# Patient Record
Sex: FEMALE | Race: BLACK OR AFRICAN AMERICAN | Employment: UNEMPLOYED | URBAN - METROPOLITAN AREA
[De-identification: names, ages, dates, MRNs, and addresses within clinical notes are randomized per-mention and may not be internally consistent; named-entity substitution may affect disease eponyms.]

---
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
## 2022-12-03 ENCOUNTER — HOSPITAL ENCOUNTER (OUTPATIENT)
Dept: DATA CONVERSION | Facility: HOSPITAL | Age: 22
End: 2022-12-03
Attending: OBSTETRICS & GYNECOLOGY

## 2022-12-03 DIAGNOSIS — O99.342 OTHER MENTAL DISORDERS COMPLICATING PREGNANCY, SECOND TRIMESTER (HHS-HCC): ICD-10-CM

## 2022-12-03 DIAGNOSIS — R10.2 PELVIC AND PERINEAL PAIN: ICD-10-CM

## 2022-12-03 DIAGNOSIS — F31.9 BIPOLAR DISORDER, UNSPECIFIED (MULTI): ICD-10-CM

## 2022-12-03 DIAGNOSIS — Z79.899 OTHER LONG TERM (CURRENT) DRUG THERAPY: ICD-10-CM

## 2022-12-03 DIAGNOSIS — O10.912 UNSPECIFIED PRE-EXISTING HYPERTENSION COMPLICATING PREGNANCY, SECOND TRIMESTER (HHS-HCC): ICD-10-CM

## 2022-12-03 DIAGNOSIS — N89.8 OTHER SPECIFIED NONINFLAMMATORY DISORDERS OF VAGINA: ICD-10-CM

## 2022-12-03 DIAGNOSIS — O98.812 OTHER MATERNAL INFECTIOUS AND PARASITIC DISEASES COMPLICATING PREGNANCY, SECOND TRIMESTER (HHS-HCC): ICD-10-CM

## 2022-12-03 DIAGNOSIS — O98.512 OTHER VIRAL DISEASES COMPLICATING PREGNANCY, SECOND TRIMESTER (HHS-HCC): ICD-10-CM

## 2022-12-03 DIAGNOSIS — B37.9 CANDIDIASIS, UNSPECIFIED: ICD-10-CM

## 2022-12-03 DIAGNOSIS — Z87.440 PERSONAL HISTORY OF URINARY (TRACT) INFECTIONS: ICD-10-CM

## 2022-12-03 DIAGNOSIS — O26.892 OTHER SPECIFIED PREGNANCY RELATED CONDITIONS, SECOND TRIMESTER (HHS-HCC): ICD-10-CM

## 2022-12-03 DIAGNOSIS — B00.9 HERPESVIRAL INFECTION, UNSPECIFIED: ICD-10-CM

## 2022-12-03 DIAGNOSIS — Z3A.16 16 WEEKS GESTATION OF PREGNANCY (HHS-HCC): ICD-10-CM

## 2022-12-03 DIAGNOSIS — R56.9 UNSPECIFIED CONVULSIONS (MULTI): ICD-10-CM

## 2022-12-03 LAB
APPEARANCE, URINE: ABNORMAL
BACTERIA, URINE: ABNORMAL /HPF
BILIRUBIN, URINE: NEGATIVE
BLOOD, URINE: NEGATIVE
CLUE CELLS WET PREP: ABNORMAL
COLOR, URINE: ABNORMAL
GLUCOSE, URINE: NEGATIVE MG/DL
KETONES, URINE: ABNORMAL MG/DL
LEUKOCYTE ESTERASE, URINE: ABNORMAL
MUCUS, URINE: ABNORMAL /LPF
NITRITE, URINE: NEGATIVE
PH, URINE: 5 (ref 5–8)
PROTEIN, URINE: ABNORMAL MG/DL
RBC, URINE: 15 /HPF (ref 0–5)
SPECIFIC GRAVITY, URINE: 1.03 (ref 1–1.03)
SQUAMOUS EPITHELIAL CELLS, URINE: 48 /HPF
TRICH WET PREP: ABNORMAL
TRICHOMONAS REFLEX COMMENT: ABNORMAL
UROBILINOGEN, URINE: 4 MG/DL (ref 0–1.9)
WBC WET PREP: ABNORMAL /HPF
WBC, URINE: 110 /HPF (ref 0–5)
YEAST PRESENCE WET PREP: PRESENT

## 2022-12-04 LAB
CHLAMYDIA TRACH., AMPLIFIED: NEGATIVE
N. GONORRHEA, AMPLIFIED: NEGATIVE
TRICHOMONAS VAGINALIS: NEGATIVE

## 2023-03-01 NOTE — PROGRESS NOTES
Current Stage:   Stage: Triage     Subjective Data:   Antepartum:  Vaginal Bleeding: No   Contractions/Abdominal Pain: No   Discharge/Loss of Fluid: Yes   Fetal Movement: None   Fevers/Chills: Yes   Preeclampsia Symptoms: No   Antepartum:    23 yo  at 16.2 wga by 13 wkUS presents with vaginal pain and irritation. Denies VB, LOF, cramping, vaginal odor. Reports vaginal irritation for past month. Pt treated for trich,  BV, yeast on . Denies VB with intercourse. Accepts STI testing today. Reports she had a fever earlier today but afebrile in triage.     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     OBHx:  : SAB  GYN hx:  Chlamydia  PMH: Depression, Vit D deficiency  PSH: denies  Fam hx: non-contributory  Meds: PNV, Acyclovir daily  All: Codeine --> hives  Social hx: denies t/e/d        Objective Information:    Objective Information:      T   P  R  BP   MAP  SpO2   Value  36.5  72  16  108/63   80  98%  Date/Time 12/3 18:11 12/3 19:20 12/3 18:11 12/3 19:20  12/3 19:20 12/3 18:08  Range  (36.5C - 36.5C )  (72 - 78 )  (16 - 18 )  (108 - 115 )/ (63 - 77 )  (80 - 82 )  (98% - 98% )      Physical Exam:   Constitutional: alert, oriented x3, conversational   Obstetric: Doptone 155    SSE: mod amt of gray/white discharge  Cervix visually closed   Eyes: Sclera white, EOM intact, no discharge or erythema   ENMT: No hearing deficit, no goiter present   Head/Neck: Normocephalic, atraumatic, full range  of motion intact   Respiratory/Thorax: normal respiratory effort   Gastrointestinal: soft, gravid, non-tender   Genitourinary: No flank pain bilaterally.  No suprapubic tenderness.   Musculoskeletal: Grossly WNL   Extremities: No edema, discoloration, or pain in  BLE   Neurological: Speech clear, no deficits noted   Psychological: Appropriate  mood, behavior. Calm,  cooperative.   Skin: no rashes or lesions     Assessment and Plan:   Assessment:    21 yo  at 16.2 wga by 13 wkUS presents with vaginal pain and irritation.     Vaginal irritation  - suspect vaginitis and UTI based on physical exam  - GC/CT, wet prep, urine culture sent  - Urine dip +3 leuks, +1 protein, trace blood, SG > 1.030  - Script for Macrobid 100 mg PO Q 12 hr x 7 days sent  - will treat vaginitis as needed once resulted  - discussed need for adequate hydration  - discussed warning signs, when to return for evaluation    Maternal Well Being  - discussed plan of care  - instructed to keep appt as scheduled on  or sooner if needed    Fetal Well Being  - Doptone 155  - Cervix visually closed    Dispo: Home with precautions    Staffed with Dr. Rianna Garcia, MSN, APRN, [facility]      Plan of Care Reviewed With:  Plan of Care Reviewed With: patient; significant  other     Attestation:   Note Completion:  I am a:  Advanced Practice Provider   Attending Only - Shared Visit with Advanced Practice Provider This is a shared visit.  I have reviewed the Advanced Practice Provider?s encounter note, approve the Advanced Practice Provider?s documentation,  and provide the following additional information from my personal encounter.    Comments/ Additional Findings    agree with exam, diagnosis, and treatment    Jason Blanca DO          Electronic Signatures:  Jason Blanca ()  (Signed 03-Dec-2022 20:38)   Authored: Note Completion   Co-Signer: Current Stage, Subjective Data, Objective Data, Assessment and Plan, Note Completion  Sherie Garcia (APRN-CNP)  (Signed 03-Dec-2022 19:45)   Authored: Current Stage, Subjective Data, Objective Data,  Assessment and Plan, Note Completion      Last Updated: 03-Dec-2022 20:38 by Jason Blanca ()

## 2023-09-06 VITALS
SYSTOLIC BLOOD PRESSURE: 113 MMHG | HEIGHT: 65 IN | WEIGHT: 225.53 LBS | OXYGEN SATURATION: 98 % | BODY MASS INDEX: 37.58 KG/M2 | HEART RATE: 73 BPM | RESPIRATION RATE: 18 BRPM | DIASTOLIC BLOOD PRESSURE: 77 MMHG

## 2023-09-14 NOTE — PROGRESS NOTES
Current Stage:   Stage: Triage     Subjective Data:   Antepartum:  Vaginal Bleeding: No   Contractions/Abdominal Pain: No   Discharge/Loss of Fluid: Yes   Fetal Movement: None   Fevers/Chills: Yes   Preeclampsia Symptoms: No   Antepartum:    21 yo  at 16.2 wga by 13 wkUS presents with vaginal pain and irritation. Denies VB, LOF, cramping, vaginal odor. Reports vaginal irritation for past month. Pt treated for trich,  BV, yeast on . Denies VB with intercourse. Accepts STI testing today. Reports she had a fever earlier today but afebrile in triage.     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     OBHx:  : SAB  GYN hx:  Chlamydia  PMH: Depression, Vit D deficiency  PSH: denies  Fam hx: non-contributory  Meds: PNV, Acyclovir daily  All: Codeine --> hives  Social hx: denies t/e/d        Objective Information:    Objective Information:      T   P  R  BP   MAP  SpO2   Value  36.5  72  16  108/63   80  98%  Date/Time 12/3 18:11 12/3 19:20 12/3 18:11 12/3 19:20  12/3 19:20 12/3 18:08  Range  (36.5C - 36.5C )  (72 - 78 )  (16 - 18 )  (108 - 115 )/ (63 - 77 )  (80 - 82 )  (98% - 98% )      Physical Exam:   Constitutional: alert, oriented x3, conversational   Obstetric: Doptone 155    SSE: mod amt of gray/white discharge  Cervix visually closed   Eyes: Sclera white, EOM intact, no discharge or erythema   ENMT: No hearing deficit, no goiter present   Head/Neck: Normocephalic, atraumatic, full range  of motion intact   Respiratory/Thorax: normal respiratory effort   Gastrointestinal: soft, gravid, non-tender   Genitourinary: No flank pain bilaterally.  No suprapubic tenderness.   Musculoskeletal: Grossly WNL   Extremities: No edema, discoloration, or pain in  BLE   Neurological: Speech clear, no deficits noted   Psychological: Appropriate  mood, behavior. Calm,  cooperative.   Skin: no rashes or lesions     Assessment and Plan:   Assessment:    23 yo  at 16.2 wga by 13 wkUS presents with vaginal pain and irritation.     Vaginal irritation  - suspect vaginitis and UTI based on physical exam  - GC/CT, wet prep, urine culture sent  - Urine dip +3 leuks, +1 protein, trace blood, SG > 1.030  - Script for Macrobid 100 mg PO Q 12 hr x 7 days sent  - will treat vaginitis as needed once resulted  - discussed need for adequate hydration  - discussed warning signs, when to return for evaluation    Maternal Well Being  - discussed plan of care  - instructed to keep appt as scheduled on  or sooner if needed    Fetal Well Being  - Doptone 155  - Cervix visually closed    Dispo: Home with precautions    Staffed with Dr. Rianna Garcia, MSN, APRN, [facility]      Plan of Care Reviewed With:  Plan of Care Reviewed With: patient; significant  other     Attestation:   Note Completion:  I am a:  Advanced Practice Provider   Attending Only - Shared Visit with Advanced Practice Provider This is a shared visit.  I have reviewed the Advanced Practice Provider?s encounter note, approve the Advanced Practice Provider?s documentation,  and provide the following additional information from my personal encounter.    Comments/ Additional Findings    agree with exam, diagnosis, and treatment    Jason Blanca DO          Electronic Signatures:  Jason Blanca ()  (Signed 03-Dec-2022 20:38)   Authored: Note Completion   Co-Signer: Current Stage, Subjective Data, Objective Data, Assessment and Plan, Note Completion  Sherie Garcia (APRN-CNP)  (Signed 03-Dec-2022 19:45)   Authored: Current Stage, Subjective Data, Objective Data,  Assessment and Plan, Note Completion      Last Updated: 03-Dec-2022 20:38 by Jason Blanca ()

## 2024-03-08 ENCOUNTER — APPOINTMENT (OUTPATIENT)
Dept: RADIOLOGY | Facility: HOSPITAL | Age: 24
End: 2024-03-08
Payer: MEDICAID

## 2024-03-08 ENCOUNTER — APPOINTMENT (OUTPATIENT)
Dept: CARDIOLOGY | Facility: HOSPITAL | Age: 24
End: 2024-03-08
Payer: MEDICAID

## 2024-03-08 ENCOUNTER — HOSPITAL ENCOUNTER (EMERGENCY)
Facility: HOSPITAL | Age: 24
Discharge: HOME | End: 2024-03-08
Payer: MEDICAID

## 2024-03-08 VITALS
TEMPERATURE: 96.8 F | HEART RATE: 68 BPM | DIASTOLIC BLOOD PRESSURE: 95 MMHG | RESPIRATION RATE: 18 BRPM | BODY MASS INDEX: 43.32 KG/M2 | WEIGHT: 260 LBS | OXYGEN SATURATION: 100 % | SYSTOLIC BLOOD PRESSURE: 141 MMHG | HEIGHT: 65 IN

## 2024-03-08 DIAGNOSIS — R07.9 CHEST PAIN, UNSPECIFIED TYPE: Primary | ICD-10-CM

## 2024-03-08 DIAGNOSIS — M79.605 LEG PAIN, BILATERAL: ICD-10-CM

## 2024-03-08 DIAGNOSIS — M79.604 LEG PAIN, BILATERAL: ICD-10-CM

## 2024-03-08 LAB
ALBUMIN SERPL-MCNC: 4.3 G/DL (ref 3.5–5)
ALP BLD-CCNC: 60 U/L (ref 35–125)
ALT SERPL-CCNC: 9 U/L (ref 5–40)
ANION GAP SERPL CALC-SCNC: 9 MMOL/L
AST SERPL-CCNC: 13 U/L (ref 5–40)
BASOPHILS # BLD AUTO: 0.04 X10*3/UL (ref 0–0.1)
BASOPHILS NFR BLD AUTO: 0.5 %
BILIRUB SERPL-MCNC: 0.4 MG/DL (ref 0.1–1.2)
BUN SERPL-MCNC: 11 MG/DL (ref 8–25)
CALCIUM SERPL-MCNC: 9.3 MG/DL (ref 8.5–10.4)
CHLORIDE SERPL-SCNC: 106 MMOL/L (ref 97–107)
CO2 SERPL-SCNC: 26 MMOL/L (ref 24–31)
CREAT SERPL-MCNC: 0.8 MG/DL (ref 0.4–1.6)
EGFRCR SERPLBLD CKD-EPI 2021: >90 ML/MIN/1.73M*2
EOSINOPHIL # BLD AUTO: 0.13 X10*3/UL (ref 0–0.7)
EOSINOPHIL NFR BLD AUTO: 1.6 %
ERYTHROCYTE [DISTWIDTH] IN BLOOD BY AUTOMATED COUNT: 12.2 % (ref 11.5–14.5)
FLUAV RNA RESP QL NAA+PROBE: NOT DETECTED
FLUBV RNA RESP QL NAA+PROBE: NOT DETECTED
GLUCOSE SERPL-MCNC: 79 MG/DL (ref 65–99)
HCG SERPL-ACNC: <1 MIU/ML
HCT VFR BLD AUTO: 40.2 % (ref 36–46)
HGB BLD-MCNC: 13.4 G/DL (ref 12–16)
IMM GRANULOCYTES # BLD AUTO: 0.01 X10*3/UL (ref 0–0.7)
IMM GRANULOCYTES NFR BLD AUTO: 0.1 % (ref 0–0.9)
LYMPHOCYTES # BLD AUTO: 3.57 X10*3/UL (ref 1.2–4.8)
LYMPHOCYTES NFR BLD AUTO: 43.6 %
MCH RBC QN AUTO: 30.9 PG (ref 26–34)
MCHC RBC AUTO-ENTMCNC: 33.3 G/DL (ref 32–36)
MCV RBC AUTO: 93 FL (ref 80–100)
MONOCYTES # BLD AUTO: 0.76 X10*3/UL (ref 0.1–1)
MONOCYTES NFR BLD AUTO: 9.3 %
NEUTROPHILS # BLD AUTO: 3.68 X10*3/UL (ref 1.2–7.7)
NEUTROPHILS NFR BLD AUTO: 44.9 %
NRBC BLD-RTO: 0 /100 WBCS (ref 0–0)
NT-PROBNP SERPL-MCNC: <36 PG/ML (ref 0–178)
PLATELET # BLD AUTO: 297 X10*3/UL (ref 150–450)
POTASSIUM SERPL-SCNC: 4 MMOL/L (ref 3.4–5.1)
PROT SERPL-MCNC: 7.9 G/DL (ref 5.9–7.9)
RBC # BLD AUTO: 4.34 X10*6/UL (ref 4–5.2)
RBC MORPH BLD: NORMAL
RSV RNA RESP QL NAA+PROBE: NOT DETECTED
SARS-COV-2 RNA RESP QL NAA+PROBE: NOT DETECTED
SODIUM SERPL-SCNC: 141 MMOL/L (ref 133–145)
TROPONIN T SERPL-MCNC: <6 NG/L
WBC # BLD AUTO: 8.2 X10*3/UL (ref 4.4–11.3)

## 2024-03-08 PROCEDURE — 84702 CHORIONIC GONADOTROPIN TEST: CPT | Performed by: PHYSICIAN ASSISTANT

## 2024-03-08 PROCEDURE — 87637 SARSCOV2&INF A&B&RSV AMP PRB: CPT | Performed by: PHYSICIAN ASSISTANT

## 2024-03-08 PROCEDURE — 99285 EMERGENCY DEPT VISIT HI MDM: CPT | Mod: 25

## 2024-03-08 PROCEDURE — 85025 COMPLETE CBC W/AUTO DIFF WBC: CPT | Performed by: PHYSICIAN ASSISTANT

## 2024-03-08 PROCEDURE — 93005 ELECTROCARDIOGRAM TRACING: CPT

## 2024-03-08 PROCEDURE — 96374 THER/PROPH/DIAG INJ IV PUSH: CPT

## 2024-03-08 PROCEDURE — 84484 ASSAY OF TROPONIN QUANT: CPT | Performed by: PHYSICIAN ASSISTANT

## 2024-03-08 PROCEDURE — 71046 X-RAY EXAM CHEST 2 VIEWS: CPT

## 2024-03-08 PROCEDURE — 93970 EXTREMITY STUDY: CPT | Performed by: RADIOLOGY

## 2024-03-08 PROCEDURE — 36415 COLL VENOUS BLD VENIPUNCTURE: CPT | Performed by: PHYSICIAN ASSISTANT

## 2024-03-08 PROCEDURE — 83880 ASSAY OF NATRIURETIC PEPTIDE: CPT | Performed by: PHYSICIAN ASSISTANT

## 2024-03-08 PROCEDURE — 2500000004 HC RX 250 GENERAL PHARMACY W/ HCPCS (ALT 636 FOR OP/ED): Performed by: PHYSICIAN ASSISTANT

## 2024-03-08 PROCEDURE — 71046 X-RAY EXAM CHEST 2 VIEWS: CPT | Performed by: RADIOLOGY

## 2024-03-08 PROCEDURE — 84075 ASSAY ALKALINE PHOSPHATASE: CPT | Performed by: PHYSICIAN ASSISTANT

## 2024-03-08 PROCEDURE — 93970 EXTREMITY STUDY: CPT

## 2024-03-08 RX ORDER — ACETAMINOPHEN 325 MG/1
650 TABLET ORAL ONCE
Status: COMPLETED | OUTPATIENT
Start: 2024-03-08 | End: 2024-03-08

## 2024-03-08 RX ORDER — KETOROLAC TROMETHAMINE 30 MG/ML
15 INJECTION, SOLUTION INTRAMUSCULAR; INTRAVENOUS ONCE
Status: COMPLETED | OUTPATIENT
Start: 2024-03-08 | End: 2024-03-08

## 2024-03-08 RX ADMIN — ACETAMINOPHEN 650 MG: 325 TABLET ORAL at 16:23

## 2024-03-08 RX ADMIN — KETOROLAC TROMETHAMINE 15 MG: 30 INJECTION, SOLUTION INTRAMUSCULAR at 16:25

## 2024-03-08 ASSESSMENT — COLUMBIA-SUICIDE SEVERITY RATING SCALE - C-SSRS
1. IN THE PAST MONTH, HAVE YOU WISHED YOU WERE DEAD OR WISHED YOU COULD GO TO SLEEP AND NOT WAKE UP?: NO
6. HAVE YOU EVER DONE ANYTHING, STARTED TO DO ANYTHING, OR PREPARED TO DO ANYTHING TO END YOUR LIFE?: NO
2. HAVE YOU ACTUALLY HAD ANY THOUGHTS OF KILLING YOURSELF?: NO

## 2024-03-08 ASSESSMENT — LIFESTYLE VARIABLES
EVER FELT BAD OR GUILTY ABOUT YOUR DRINKING: NO
HAVE YOU EVER FELT YOU SHOULD CUT DOWN ON YOUR DRINKING: NO
EVER HAD A DRINK FIRST THING IN THE MORNING TO STEADY YOUR NERVES TO GET RID OF A HANGOVER: NO
HAVE PEOPLE ANNOYED YOU BY CRITICIZING YOUR DRINKING: NO

## 2024-03-08 NOTE — ED PROVIDER NOTES
HPI   Chief Complaint   Patient presents with    Chest Pain     Pt complains of chest pain on the right side. Recenty diagnosed wit lupus was told to come here if she was in pain       HPI     Patient is a 23-year-old female with a history of lupus presenting for right-sided chest pain.  The chest pain is been present for 2 weeks, dull, aching and constant.  No radiation of pain to the left side or to the back.  No associated shortness of breath.  Patient states that she attempted to get an appointment with her rheumatologist but they were unable to see her for the next 2 to 3 months and she was told to come to the emergency department for evaluation.  She is also complaining of generalized body aches and pains with swelling to the lower extremities.  She denies recent travel or unilateral leg swelling.  No hormone use.  No history of DVT or PE.  No recent surgery.  No abdominal pain, nausea, vomiting, diarrhea or constipation.  No numbness or weakness.  Patient has no cardiac history.               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    Delayed sleep phase syndrome    Elevation of levels of liver transaminase levels 11/08/2018    Transaminitis    Encounter for immunization 11/30/2016    Immunization due    Headache, unspecified 01/07/2020    Chronic daily headache    Hypersomnia, unspecified 02/21/2018    Hypersomnia    Inadequate sleep hygiene 02/21/2018    Inadequate sleep hygiene    Insufficient sleep syndrome 02/21/2018    Insufficient sleep syndrome    Morbid (severe) obesity due to excess calories (CMS/[facility])     Class 3 severe obesity due to excess calories with serious comorbidity and body mass index (BMI) of 40.0 to 44.9 in adult    Other conditions influencing health status 10/13/2022    History of  pregnancy    Other specified abnormal findings of blood chemistry 11/05/2018    Low vitamin D level    Other symptoms and signs involving the nervous system 01/17/2018    Suspected sleep apnea    Personal history of other diseases of the digestive system 05/06/2020    History of constipation    Personal history of other endocrine, nutritional and metabolic disease 11/05/2018    History of obesity    Personal history of other specified conditions 11/14/2019    History of chest pain    Personal history of other specified conditions 04/23/2019    History of palpitations    Personal history of other specified conditions 11/13/2019    History of urinary frequency    Personal history of other specified conditions 11/13/2019    History of diarrhea    Presence of spectacles and contact lenses 11/30/2016    Wears glasses     No past surgical history on file.  No family history on file.  Social History     Tobacco Use    Smoking status: Not on file    Smokeless tobacco: Not on file   Substance Use Topics    Alcohol use: Not on file    Drug use: Not on file       Physical Exam   ED Triage Vitals [03/08/24 1558]   Temperature Heart Rate Respirations BP   36 °C (96.8 °F) 68 18 (!) 141/95      Pulse Ox Temp Source Heart Rate Source Patient Position   100 % Oral -- Sitting      BP Location FiO2 (%)     Right arm --       Physical Exam  GENERAL: Well nourished and well developed. Answers questions appropriately.    SKIN: Clean and dry without evidence of rashes.    HEENT: Skull normocephalic, atraumatic. No scleral icterus. PERRLA, with EOMI.  Mucous membranes moist and pink in color.     RESPIRATORY: Clear to ausculation with normal effort. No adventitious sounds, intercostal retractions or shallow breathing.    CARDIOVASCULAR: Regular rate and rhythm with normal S1, S2. No S3, S4, murmurs or gallops. Capillary refill brisk, less than 3 seconds bilaterally.  Diffuse bilateral extremity swelling.    ABD/: Soft, nontender abdomen.  Bowel sounds equal in all four quadrants. No tenderness, rebound, guarding or rigidity.    MSK: Spontaneously moves all 4 extremities without difficulty.  No injury or obvious deformity.      NEURO/PSYCH: A&Ox3. Cranial nerves II-XII grossly intact. No focal motor or sensory deficits. Appropriate mood and behavior.    ED Course & MDM   Diagnoses as of 03/08/24 1922   Chest pain, unspecified type   Leg pain, bilateral       Medical Decision Making  Parts of this chart have been completed using voice recognition software. Please excuse any errors of transcription. Despite the medical decision making time stamp above-my medical decision making has taken place during the patient's entire visit. My thought process and reason for plan has been formulated from the time that I saw the patient until the time of disposition and is not specific to one specific moment during their visit and furthermore my MDM encompasses this entire chart and not only this text box.      HPI: Detailed above.    Exam: A medically appropriate exam performed, outlined above, given the known history and presentation.    History obtained from: patient    Social Determinants of Health considered during this visit: age    EKG interpreted by my attending physician, reviewed by myself.    Labs Reviewed   COMPREHENSIVE METABOLIC PANEL - Normal       Result Value    Glucose 79      Sodium 141      Potassium 4.0      Chloride 106      Bicarbonate 26      Urea Nitrogen 11      Creatinine 0.80      eGFR >90      Calcium 9.3      Albumin 4.3      Alkaline Phosphatase 60      Total Protein 7.9      AST 13      Bilirubin, Total 0.4      ALT 9      Anion Gap 9     SARS-COV-2 AND INFLUENZA A/B PCR - Normal    Flu A Result Not Detected      Flu B Result Not Detected      Coronavirus 2019, PCR Not Detected      Narrative:     This assay has received FDA Emergency Use Authorization (EUA) and  is only authorized for the duration of time that circumstances exist to  justify the authorization of the emergency use of in vitro diagnostic tests for the detection of SARS-CoV-2 virus and/or diagnosis of COVID-19 infection under section 564(b)(1) of the Act, 21 U.S.C. 360bbb-3(b)(1). Testing for SARS-CoV-2 is only recommended for patients who meet current clinical and/or epidemiological criteria as defined by federal, state, or local public health directives. This assay is an in vitro diagnostic nucleic acid amplification test for the qualitative detection of SARS-CoV-2, Influenza A, and Influenza B from nasopharyngeal specimens and has been validated for use at [facility]. Negative results do not preclude COVID-19 infections or Influenza A/B infections, and should not be used as the sole basis for diagnosis, treatment, or other management decisions. If Influenza A/B and RSV PCR results are negative, testing for Parainfluenza virus, Adenovirus and Metapneumovirus is routinely performed for [facility] [place], [place] pediatric oncology and intensive care inpatients, and is available on other patients by placing an add-on request.    RSV PCR - Normal    RSV PCR Not Detected      Narrative:     This assay is an FDA-cleared, in vitro diagnostic nucleic acid amplification test for the detection of RSV from nasopharyngeal specimens, and has been validated for use at [facility]. Negative results do not preclude RSV infections, and should not be used as the sole basis for diagnosis, treatment, or other management decisions. If Influenza A/B and RSV PCR results are negative, testing for Parainfluenza virus, Adenovirus and Metapneumovirus is routinely performed for pediatric oncology and intensive care inpatients at [facility] [place], [place], and is available on other patients by placing an add-on request.       SERIAL TROPONIN, INITIAL ([facility]) - Normal    Troponin T, High Sensitivity <6     N-TERMINAL PROBNP - Normal    PROBNP <36      Narrative:     Reference ranges are based on clinical  submission data. These ranges represent the 95th percentile of normal cut-off points. As NT Pro- BNP values approach 1000 pg/ml, clinical symptoms are more likely associated with CHF.   CBC WITH AUTO DIFFERENTIAL    WBC 8.2      nRBC 0.0      RBC 4.34      Hemoglobin 13.4      Hematocrit 40.2      MCV 93      MCH 30.9      MCHC 33.3      RDW 12.2      Platelets 297      Neutrophils % 44.9      Immature Granulocytes %, Automated 0.1      Lymphocytes % 43.6      Monocytes % 9.3      Eosinophils % 1.6      Basophils % 0.5      Neutrophils Absolute 3.68      Immature Granulocytes Absolute, Automated 0.01      Lymphocytes Absolute 3.57      Monocytes Absolute 0.76      Eosinophils Absolute 0.13      Basophils Absolute 0.04     HUMAN CHORIONIC GONADOTROPIN, SERUM QUANTITATIVE    HCG, Beta-Quantitative <1     TROPONIN T SERIES, HIGH SENSITIVITY (0, 2 HR, 6 HR)    Narrative:     The following orders were created for panel order Troponin T Series, High Sensitivity (0, 2HR, 6HR).  Procedure                               Abnormality         Status                     ---------                               -----------         ------                     Serial Troponin, Initial...[[id number]]  Normal              Final result               Serial Troponin, 2 Hour ...[[id number]]                                                 Serial Troponin, 6 Hour ...[[id number]]                                                   Please view results for these tests on the individual orders.   SERIAL TROPONIN,  2 HOUR ([facility])   SERIAL TROPONIN, 6 HOUR ([facility])   MORPHOLOGY    RBC Morphology No significant RBC morphology present        XR chest 2 views   Final Result   No acute cardiopulmonary process.             Signed by: Kalia Villareal 3/8/2024 5:49 PM   Dictation workstation:   [id number]      Vascular US lower extremity venous duplex bilateral   Final Result   Negative study.  No deep venous thrombosis within the bilateral lower    extremities.        MACRO:   None        Signed by: Kalia Mingokrish 3/8/2024 5:21 PM   Dictation workstation:   [id number]           Medications   ketorolac (Toradol) injection 15 mg (15 mg intravenous Given 3/8/24 1625)   acetaminophen (Tylenol) tablet 650 mg (650 mg oral Given 3/8/24 1623)        Differential diagnoses considered for this visit include: Acute coronary syndrome versus pneumonia versus    Considerations/further MDM:    Patient is a 23-year-old female presenting for evaluation of right-sided chest pain.  Vitals appreciated mild hypertension, otherwise no tachycardia or hypoxia.  Visible examination was unremarkable with heart and lung sounds normal.  Abdomen is soft and nontender.  Cardiac workup was ensued.  PERC score of 0.  Testing negative.  Initial troponin of 6.  CBC and CMP within normal limits.  proBNP unremarkable. Vascular ultrasound of the lower extremities venous duplex bilateral was a negative study.  No evidence for deep vein thrombosis within the bilateral lower extremities.  Chest x-ray was negative for acute cardiopulmonary processes. Patient has a HEART score of 0. She was found suitable for discharge to home. Patient's results are discussed with her and she was understanding during discussion.  She felt comfortable to follow-up outpatient with her primary care provider.  She was released to home in good condition.    I estimate there is LOW risk for ACUTE CORONARY SYNDROME INCLUDING MI, INTRACRANIAL HEMORRHAGE, MALIGNANT DYSRHYTHMIA or HYPERTENSION, PERICARDIAL TAMPONADE, PNEUMOTHORAX, PULMONARY EMBOLISM, SEPSIS, SUBARACHNOID HEMORRHAGE, SUBDURAL HEMATOMA, STROKE, TIA, PNA RESPIRATORY DISTRESS/COMPRIMISE, PERICARIDIAL EFFUSION, or THORACIC AORTIC DISSECTION, thus I consider the discharge disposition reasonable. We have discussed the diagnosis and risks, and we agree with discharging home to follow-up with their primary doctor or specialist as discussed and as provided. We also  discussed returning to the Emergency Department immediately if new or worsening symptoms occur. We have discussed the symptoms which are most concerning (e.g., bloody sputum, fever, worsening pain or shortness of breath, vomiting, weakness) that necessitate immediate return.    Attending physician Dr. Hamzah Beth was available for consultation.  Patient's history, physical exam, diagnostic studies, and treatment plan were discussed thoroughly.    Procedure  Procedures     Ioana Hull PA-C  03/08/24 1922

## 2024-03-09 NOTE — DISCHARGE INSTRUCTIONS
Thank you for choosing [facility]  for your emergency care.    Please return to the Emergency Department immediately if new or worsening symptoms occur. Symptoms of that are most concerning include worsening chest pain, shortness of breath, lightheadedness or dizziness.    It is important to remember that your care does not end here and you must continue to monitor your condition closely. Please return to the emergency department for any worsening or concerning signs or symptoms as directed by our conversations and the discharge instructions. Otherwise please follow up with your doctor in 2 days if no better or worse. If you do not have a doctor please contact the referral number on your discharge instructions. Please contact any physician specialists provided in your discharge notes as it is very important to follow up with them regarding your condition. If you are unable to reach the physicians provided, please come back to the Emergency Department at any time.      As always, please take medications as directed. If you have any questions at all regarding your medications, please contact the pharmacist, the emergency department, or your doctor. Before taking any medication prescribed in the Emergency Department, please review the medication side effects and drug interactions as they may interact with your home medications.     Education materials have been provided to you about your encounter today.  Please review the attachments at your earliest convenience.

## 2024-03-11 LAB
ATRIAL RATE: 78 BPM
P AXIS: 69 DEGREES
P OFFSET: 192 MS
P ONSET: 137 MS
PR INTERVAL: 170 MS
Q ONSET: 222 MS
QRS COUNT: 13 BEATS
QRS DURATION: 82 MS
QT INTERVAL: 356 MS
QTC CALCULATION(BAZETT): 405 MS
QTC FREDERICIA: 388 MS
R AXIS: 99 DEGREES
T AXIS: 31 DEGREES
T OFFSET: 400 MS
VENTRICULAR RATE: 78 BPM

## 2024-04-02 ENCOUNTER — OFFICE VISIT (OUTPATIENT)
Dept: NEUROLOGY | Facility: CLINIC | Age: 24
End: 2024-04-02
Payer: MEDICAID

## 2024-04-02 VITALS
SYSTOLIC BLOOD PRESSURE: 124 MMHG | HEIGHT: 65 IN | BODY MASS INDEX: 44.98 KG/M2 | DIASTOLIC BLOOD PRESSURE: 72 MMHG | WEIGHT: 270 LBS | HEART RATE: 64 BPM

## 2024-04-02 DIAGNOSIS — D68.62 LUPUS ANTICOAGULANT DISORDER (MULTI): Primary | ICD-10-CM

## 2024-04-02 DIAGNOSIS — E65 ABDOMINAL OBESITY: ICD-10-CM

## 2024-04-02 PROCEDURE — 99215 OFFICE O/P EST HI 40 MIN: CPT | Performed by: PSYCHIATRY & NEUROLOGY

## 2024-04-02 RX ORDER — VALACYCLOVIR HYDROCHLORIDE 500 MG/1
500 TABLET, FILM COATED ORAL DAILY
COMMUNITY

## 2024-04-02 ASSESSMENT — ENCOUNTER SYMPTOMS
BACK PAIN: 1
MYALGIAS: 1
JOINT SWELLING: 1
ARTHRALGIAS: 1

## 2024-04-02 ASSESSMENT — VISUAL ACUITY: VA_NORMAL: 1

## 2024-04-02 NOTE — PROGRESS NOTES
"Consults    Chief Complaint:  knee pain, numbess, swelling radiating to feet bilaterally    History Of Present Illness    Aliyah Green is a 23 y.o. female with PMH of lupus anticoagulant, PCOS, morbid obesity presenting with c/o knee pain, numbness, swelling radiating to feet bilaterally. \"Hurts to walk, \"I start limping\". Tried compression socks/diuretics without success. Symptoms have been going on approx 3 months. Not relieved by anything, worsened by standing or walking around. 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 Of note, she was prescribed gabapentin without success.   Denies any s/s upper extremities, denies UE pain, swelling, or limited ROM,SOB,CP, fever,N/V,fever,chills,HA/dizziness.     Past Medical and Surgical History    Past Medical History:   Diagnosis Date    Abnormal reflex 01/07/2020    Hyperreflexia of lower extremity    Circadian rhythm sleep disorder, delayed sleep phase type 04/03/2020    Delayed sleep phase syndrome    Elevation of levels of liver transaminase levels 11/08/2018    Transaminitis    Encounter for immunization 11/30/2016    Immunization due    Headache, unspecified 01/07/2020    Chronic daily headache    Hypersomnia, unspecified 02/21/2018    Hypersomnia    Inadequate sleep hygiene 02/21/2018    Inadequate sleep hygiene    Insufficient sleep syndrome 02/21/2018    Insufficient sleep syndrome    Morbid (severe) obesity due to excess calories (CMS/HCC)     Class 3 severe obesity due to excess calories with serious comorbidity and body mass index (BMI) of 40.0 to 44.9 in adult    Other conditions influencing health status 10/13/2022    History of pregnancy    Other specified abnormal findings of blood chemistry 11/05/2018    Low vitamin D level    Other symptoms and signs involving the nervous system 01/17/2018    Suspected sleep apnea    Personal history of other diseases of the digestive system 05/06/2020    History of constipation    Personal history of other endocrine, nutritional " and metabolic disease 11/05/2018    History of obesity    Personal history of other specified conditions 11/14/2019    History of chest pain    Personal history of other specified conditions 04/23/2019    History of palpitations    Personal history of other specified conditions 11/13/2019    History of urinary frequency    Personal history of other specified conditions 11/13/2019    History of diarrhea    Presence of spectacles and contact lenses 11/30/2016    Wears glasses          No past surgical history on file.     Social History  Social History     Tobacco Use    Smoking status: Never    Smokeless tobacco: Never   Substance Use Topics    Alcohol use: Never    Drug use: Never     Allergies  Codeine, Amoxicillin, and Penicillins  (Not in a hospital admission)      Review of Systems   Musculoskeletal:  Positive for arthralgias, back pain, gait problem, joint swelling and myalgias.         Cardiovascular system: S1-S2 intact  Respiratory clear to auscultation bilateral on deep breathing he feels irritability on the left side of the chest.    Neurological Exam  Mental Status  Awake, alert and oriented to person, place and time.    Cranial Nerves  CN II: Visual acuity is normal. Visual fields full to confrontation.  CN III, IV, VI: Extraocular movements intact bilaterally. Normal lids and orbits bilaterally. Pupils equal round and reactive to light bilaterally.  CN V: Facial sensation is normal.  CN VII: Full and symmetric facial movement.  CN VIII: Hearing is normal.  CN IX, X: Palate elevates symmetrically. Normal gag reflex.  CN XI: Shoulder shrug strength is normal.  CN XII: Tongue midline without atrophy or fasciculations.    Motor   Strength is 5/5 throughout all four extremities.    Sensory  Sensation is intact to light touch, pinprick, vibration and proprioception in all four extremities.    Reflexes  Deep tendon reflexes are 2+ and symmetric except as noted.    Coordination    Finger-to-nose, rapid  "alternating movements and heel-to-shin normal bilaterally without dysmetria.    Gait    Antalgic gait .        Last Recorded Vitals  Blood pressure 124/72, pulse 64, height 1.651 m (5' 5\"), weight 122 kg (270 lb).    Relevant Results      Current Outpatient Medications:     valACYclovir (Valtrex) 500 mg tablet, Take 1 tablet (500 mg) by mouth once daily., Disp: , Rfl:      Continuous medications    PRN medications, reviewed    [unfilled]    I have personally reviewed the following imaging for brain (CT/ MR) and results and discussed in detail with the patient/care giver/family     US DOPPLER COMPLETE    Result Date: 3/28/2024  * * *Final Report* * * DATE OF EXAM: Mar 28 2024 12:47AM   EUU   1033  -  US DOPPLER COMPLETE  / ACCESSION #  [id number] PROCEDURE REASON: Ovarian torsion      * * * * Physician Interpretation * * * * RESULT: EXAMINATION:   TRANSVAGINAL AND LIMITED TRANSABDOMINAL FEMALE PELVIC ULTRASOUND CLINICAL HISTORY:  Pain, exclude ovarian torsion. LMP 3/12/2024. G2, P0. TECHNIQUE: Sonography of the pelvis was performed by transvaginal and transabdominal (limited) techniques.  Images were obtained and stored in a permanent archive. MQ:  [facility]_2022 COMPARISON: CT abdomen and pelvis 8/29/2023. Pelvic ultrasound 11/7/2022. RESULT: Uterus: -Size: 7.1 x 3.3 x 4.4 cm -Orientation: Anteverted -Endometrial echo complex: Evaluation of the endometrium was adequate. No endometrial abnormality. The endometrial echo complex measured 0.5 cm. -Cervix: Nabothian cysts present, otherwise unremarkable. -Adenomyosis assessment: There are no sonographic findings of adenomyosis. -Fibroids: There are no fibroids. Right Ovary: 4.3 x 1.8 x 2.0 cm  - Normal sonographic appearance with physiologic follicles.  Doppler imaging showed normal arterial and venous flow throughout the ovary. Left Ovary: 3.9 x 2.8 x 3.5 cm  - Normal sonographic appearance with physiologic follicles.  Doppler imaging showed normal arterial and venous flow " throughout the ovary. Free Fluid: Small free fluid is likely physiologic.    IMPRESSION: Normal sonographic appearance of the female pelvis. Transcribed Using Voice Recognition Transcribe Date/Time: Mar 28 2024 12:59A Dictated by: ASIM BARNES MD This examination was interpreted and the report reviewed and electronically signed by: ASIM BARNES MD on Mar 28 2024  1:04AM  EST    US pelvis transvaginal    Result Date: 3/28/2024  * * *Final Report* * * DATE OF EXAM: Mar 28 2024 12:47AM   EUU   1060  -  US FEMALE PELVIS TRANSVAG  / ACCESSION #  [id number] PROCEDURE REASON: Ovarian torsion      * * * * Physician Interpretation * * * * RESULT: EXAMINATION:   TRANSVAGINAL AND LIMITED TRANSABDOMINAL FEMALE PELVIC ULTRASOUND CLINICAL HISTORY:  Pain, exclude ovarian torsion. LMP 3/12/2024. G2, P0. TECHNIQUE: Sonography of the pelvis was performed by transvaginal and transabdominal (limited) techniques.  Images were obtained and stored in a permanent archive. MQ:  [facility]_2022 COMPARISON: CT abdomen and pelvis 8/29/2023. Pelvic ultrasound 11/7/2022. RESULT: Uterus: -Size: 7.1 x 3.3 x 4.4 cm -Orientation: Anteverted -Endometrial echo complex: Evaluation of the endometrium was adequate. No endometrial abnormality. The endometrial echo complex measured 0.5 cm. -Cervix: Nabothian cysts present, otherwise unremarkable. -Adenomyosis assessment: There are no sonographic findings of adenomyosis. -Fibroids: There are no fibroids. Right Ovary: 4.3 x 1.8 x 2.0 cm  - Normal sonographic appearance with physiologic follicles.  Doppler imaging showed normal arterial and venous flow throughout the ovary. Left Ovary: 3.9 x 2.8 x 3.5 cm  - Normal sonographic appearance with physiologic follicles.  Doppler imaging showed normal arterial and venous flow throughout the ovary. Free Fluid: Small free fluid is likely physiologic.    IMPRESSION: Normal sonographic appearance of the female pelvis. Transcribed Using Voice Recognition Transcribe  Date/Time: Mar 28 2024 12:59A Dictated by: ASIM BARNES MD This examination was interpreted and the report reviewed and electronically signed by: ASIM BARNES MD on Mar 28 2024  1:04AM  EST    US pelvis limited    Result Date: 3/28/2024  * * *Final Report* * * DATE OF EXAM: Mar 28 2024 12:47AM   EUU   1059  -  US FEMALE PELVIS TRANSABD  LTD  / ACCESSION #  [id number] PROCEDURE REASON: Ovarian torsion      * * * * Physician Interpretation * * * * RESULT: EXAMINATION:   TRANSVAGINAL AND LIMITED TRANSABDOMINAL FEMALE PELVIC ULTRASOUND CLINICAL HISTORY:  Pain, exclude ovarian torsion. LMP 3/12/2024. G2, P0. TECHNIQUE: Sonography of the pelvis was performed by transvaginal and transabdominal (limited) techniques.  Images were obtained and stored in a permanent archive. MQ:  [facility]_2022 COMPARISON: CT abdomen and pelvis 8/29/2023. Pelvic ultrasound 11/7/2022. RESULT: Uterus: -Size: 7.1 x 3.3 x 4.4 cm -Orientation: Anteverted -Endometrial echo complex: Evaluation of the endometrium was adequate. No endometrial abnormality. The endometrial echo complex measured 0.5 cm. -Cervix: Nabothian cysts present, otherwise unremarkable. -Adenomyosis assessment: There are no sonographic findings of adenomyosis. -Fibroids: There are no fibroids. Right Ovary: 4.3 x 1.8 x 2.0 cm  - Normal sonographic appearance with physiologic follicles.  Doppler imaging showed normal arterial and venous flow throughout the ovary. Left Ovary: 3.9 x 2.8 x 3.5 cm  - Normal sonographic appearance with physiologic follicles.  Doppler imaging showed normal arterial and venous flow throughout the ovary. Free Fluid: Small free fluid is likely physiologic.    IMPRESSION: Normal sonographic appearance of the female pelvis. Transcribed Using Voice Recognition Transcribe Date/Time: Mar 28 2024 12:59A Dictated by: ASIM BARNES MD This examination was interpreted and the report reviewed and electronically signed by: ASIM BARNES MD on Mar 28 2024  1:04AM   EST    ECG 12 lead    Result Date: 3/11/2024  Normal sinus rhythm Nonspecific T wave abnormality Abnormal ECG When compared with ECG of 15-SEP-2023 12:37, Previous ECG has undetermined rhythm, needs review Confirmed by Binh Mclean ([id number]) on 3/11/2024 12:02:11 PM    XR chest 2 views    Result Date: 3/8/2024  Interpreted By:  Kalia Villareal, STUDY: XR CHEST 2 VIEWS; 3/8/2024 5:41 pm   INDICATION: Signs/Symptoms:chest pain   COMPARISON: 09/15/2023   ACCESSION NUMBER(S): [id number]   ORDERING CLINICIAN: JYOTI GOSS   TECHNIQUE: PA and LAT views of the chest were obtained.   FINDINGS: The cardiomediastinal silhouette is unremarkable. The lungs are clear. No pleural effusion is identified.   The osseous structures are intact.       No acute cardiopulmonary process.     Signed by: Kalia Villareal 3/8/2024 5:49 PM Dictation workstation:   [id number]    Vascular US lower extremity venous duplex bilateral    Result Date: 3/8/2024  Interpreted By:  Kalia Villareal, STUDY: VASC US LOWER EXTREMITY VENOUS DUPLEX BILATERAL  3/8/2024 5:10 pm   INDICATION: Bilateral leg swelling persistent, concern for DVT   COMPARISON: None.   ACCESSION NUMBER(S): [id number]   ORDERING CLINICIAN: JYOTI GOSS   TECHNIQUE: Routine ultrasound of the bilateral lower extremities was performed with duplex Doppler (color and spectral) evaluation.   Static images were obtained for remote interpretation.   FINDINGS: THIGH VEINS:  The bilateral common femoral, femoral, popliteal, proximal medial saphenous, and deep femoral veins are patent and free of thrombus.  The veins are normally compressible.  They demonstrate normal phasic flow and augmentation response.       Negative study.  No deep venous thrombosis within the bilateral lower extremities.   MACRO: None   Signed by: Kalia Villareal 3/8/2024 5:21 PM Dictation workstation:   [id number]        Assessment/Plan     #LE swelling/tenderness likely secondary to mechanical stress  on joints   #Hx PCOS/Lupus AC   -Weight loss management   -Exercise regimen, low intensity: aqua therapy  -Endocrinology referral provided for weight management  -Follow up on autoimmune disorder panel: MYLENE/JOAQUIN/ANCA/TPO          Patient/Family Education: Extensive time was spent educating the patient on relevant anatomy, clinical findings and imaging, as well as discussing the potential diagnoses as discussed above.  Pharmacology: as above. Exercise: I discussed the importance of maintaining a daily exercise program, including stretching and strengthening. Preventative strategies were reviewed, specifically avoidance of any exercises that exacerbate pain.Return to online virtual visit/ clinic visit for follow-up with Dr. Kirkpatrick in 2 weeks or sooner as needed.The patient expressed understanding and agreement with the assessment and plan.  Patient encouraged to contact us should they have any questions, concerns, or any changes in symptoms. Thank you for allowing me to participate in the care of your patient.** This note is created using speech recognition transcription software. Despite proofreading, several typographical errors might be present that might affect the meaning of the content. Please call with any questions.**      Valeria Clark MD

## 2024-04-11 PROBLEM — D68.62 LUPUS ANTICOAGULANT DISORDER (MULTI): Status: ACTIVE | Noted: 2024-04-11

## 2024-04-12 ENCOUNTER — TELEPHONE (OUTPATIENT)
Dept: HEMATOLOGY/ONCOLOGY | Facility: CLINIC | Age: 24
End: 2024-04-12
Payer: MEDICAID

## 2024-04-17 ENCOUNTER — LAB (OUTPATIENT)
Dept: LAB | Facility: LAB | Age: 24
End: 2024-04-17
Payer: MEDICAID

## 2024-04-17 DIAGNOSIS — E65 ABDOMINAL OBESITY: ICD-10-CM

## 2024-04-17 DIAGNOSIS — D68.62 LUPUS ANTICOAGULANT DISORDER (MULTI): ICD-10-CM

## 2024-04-17 LAB
PROT SERPL-MCNC: 7.2 G/DL (ref 6.4–8.2)
URATE SERPL-MCNC: 6.1 MG/DL (ref 2.3–6.7)

## 2024-04-17 PROCEDURE — 84155 ASSAY OF PROTEIN SERUM: CPT

## 2024-04-17 PROCEDURE — 86038 ANTINUCLEAR ANTIBODIES: CPT

## 2024-04-17 PROCEDURE — 86147 CARDIOLIPIN ANTIBODY EA IG: CPT

## 2024-04-17 PROCEDURE — 84207 ASSAY OF VITAMIN B-6: CPT

## 2024-04-17 PROCEDURE — 86235 NUCLEAR ANTIGEN ANTIBODY: CPT

## 2024-04-17 PROCEDURE — 36415 COLL VENOUS BLD VENIPUNCTURE: CPT

## 2024-04-17 PROCEDURE — 86036 ANCA SCREEN EACH ANTIBODY: CPT

## 2024-04-17 PROCEDURE — 84550 ASSAY OF BLOOD/URIC ACID: CPT

## 2024-04-17 PROCEDURE — 84165 PROTEIN E-PHORESIS SERUM: CPT | Performed by: PSYCHIATRY & NEUROLOGY

## 2024-04-17 PROCEDURE — 85730 THROMBOPLASTIN TIME PARTIAL: CPT

## 2024-04-17 PROCEDURE — 84165 PROTEIN E-PHORESIS SERUM: CPT

## 2024-04-17 PROCEDURE — 83516 IMMUNOASSAY NONANTIBODY: CPT

## 2024-04-17 PROCEDURE — 86225 DNA ANTIBODY NATIVE: CPT

## 2024-04-17 PROCEDURE — 86376 MICROSOMAL ANTIBODY EACH: CPT

## 2024-04-17 PROCEDURE — 85610 PROTHROMBIN TIME: CPT

## 2024-04-18 ENCOUNTER — APPOINTMENT (OUTPATIENT)
Dept: HEMATOLOGY/ONCOLOGY | Facility: CLINIC | Age: 24
End: 2024-04-18
Payer: MEDICAID

## 2024-04-18 ENCOUNTER — LAB (OUTPATIENT)
Dept: LAB | Facility: CLINIC | Age: 24
End: 2024-04-18
Payer: MEDICAID

## 2024-04-18 ENCOUNTER — OFFICE VISIT (OUTPATIENT)
Dept: HEMATOLOGY/ONCOLOGY | Facility: CLINIC | Age: 24
End: 2024-04-18
Payer: MEDICAID

## 2024-04-18 VITALS
BODY MASS INDEX: 45.32 KG/M2 | OXYGEN SATURATION: 98 % | TEMPERATURE: 97.2 F | HEART RATE: 60 BPM | SYSTOLIC BLOOD PRESSURE: 120 MMHG | RESPIRATION RATE: 18 BRPM | HEIGHT: 64 IN | WEIGHT: 265.43 LBS | DIASTOLIC BLOOD PRESSURE: 87 MMHG

## 2024-04-18 DIAGNOSIS — D68.62 LUPUS ANTICOAGULANT DISORDER (MULTI): Primary | ICD-10-CM

## 2024-04-18 DIAGNOSIS — R79.1 ABNORMAL COAGULATION PROFILE: ICD-10-CM

## 2024-04-18 DIAGNOSIS — D68.62 LUPUS ANTICOAGULANT DISORDER (MULTI): ICD-10-CM

## 2024-04-18 LAB
ANA SER QL HEP2 SUBST: NEGATIVE
APTT PPP: 50 SECONDS (ref 27–38)
CARDIOLIPIN IGA SERPL-ACNC: <0.5 APL U/ML
CARDIOLIPIN IGG SER IA-ACNC: <1.6 GPL U/ML
CARDIOLIPIN IGM SER IA-ACNC: 0.2 MPL U/ML
CENTROMERE B AB SER-ACNC: <0.2 AI
CHROMATIN AB SERPL-ACNC: <0.2 AI
DSDNA AB SER-ACNC: <1 IU/ML
ENA JO1 AB SER QL IA: <0.2 AI
ENA RNP AB SER IA-ACNC: <0.2 AI
ENA SCL70 AB SER QL IA: <0.2 AI
ENA SM AB SER IA-ACNC: <0.2 AI
ENA SM+RNP AB SER QL IA: <0.2 AI
ENA SS-A AB SER IA-ACNC: <0.2 AI
ENA SS-B AB SER IA-ACNC: <0.2 AI
INR PPP: 1.1 (ref 0.9–1.1)
PROTHROMBIN TIME: 12.1 SECONDS (ref 9.8–12.8)
RIBOSOMAL P AB SER-ACNC: <0.2 AI
THYROPEROXIDASE AB SERPL-ACNC: 44 IU/ML

## 2024-04-18 PROCEDURE — 1036F TOBACCO NON-USER: CPT | Performed by: INTERNAL MEDICINE

## 2024-04-18 PROCEDURE — 99203 OFFICE O/P NEW LOW 30 MIN: CPT | Performed by: INTERNAL MEDICINE

## 2024-04-18 PROCEDURE — 99213 OFFICE O/P EST LOW 20 MIN: CPT | Performed by: INTERNAL MEDICINE

## 2024-04-18 ASSESSMENT — PATIENT HEALTH QUESTIONNAIRE - PHQ9
2. FEELING DOWN, DEPRESSED OR HOPELESS: NOT AT ALL
SUM OF ALL RESPONSES TO PHQ9 QUESTIONS 1 AND 2: 0
1. LITTLE INTEREST OR PLEASURE IN DOING THINGS: NOT AT ALL

## 2024-04-18 ASSESSMENT — PAIN SCALES - GENERAL: PAINLEVEL: 0-NO PAIN

## 2024-04-18 ASSESSMENT — ENCOUNTER SYMPTOMS
OCCASIONAL FEELINGS OF UNSTEADINESS: 0
LOSS OF SENSATION IN FEET: 0
DEPRESSION: 0

## 2024-04-18 NOTE — PROGRESS NOTES
"Patient here today as a new patient visit with Dr. Darby for abnormal coagulation profile as referred by her PCP    She reports some fatigue that has been going on \" for a couple months\". She reports fluctuating in weight, and an okay appetite. She says she has some random bruising. No s/s bleeding. Some heavy periods.     She denies pain of any kind.      Physician saw patient at bedside, plan of care reviewed. Labs unable to be obtained by phlebotomist due to poor venous access. Pt is going to try again at outpatient lab.     Plan is to follow up in 4 weeks and to discuss labs and lupus anti coag results.     AVS reviewed and pt denies any questions or needs.   "

## 2024-04-18 NOTE — PROGRESS NOTES
Patient ID: Aliyah Green is a 23 y.o. female.  Referring Physician: No referring provider defined for this encounter.  Primary Care Provider: Zachary Petersen MD  Referral Reason:     Subjective: LA positive, elevated PTT      Heme/Onc History:    23 yobf who is referred for evaluation of an elevated aPTT. She has a history of PCOS, hypertension, depression and traumatic brain injury. She had revcurrent strep pharyngitis over the past 2 years and underwent tonsillectomy on January 18th. She was given steroids and doxycycline postop. This was followed by thrush and then delayed bleeding 7 days later. No anemia but her aPTT was again elevated. It was also slightly increased on 1/11 and 12/11/23 when she had other coagulation testing for recurrent pregnancy loss:    12/11/2023 1/11/2024 1/25/2024  Platelet Neut Negative Negative  DRVVT Screen 32.0 - 45.7 seconds 33.7  DRVVT Confirm Ratio <1.32 0.98  DRVVT 1:1 Mix 32.0 - 45.7 seconds 35.4  Hex Phase Screen 34.0 - 51.8 seconds 53.8 (H)  Hex Phase Confirm 34.2 - 47.9 seconds 48.0 (H)  Hex Phase Delta <7.1 delta seconds 5.9  APTT Screen 24.0 - 35.1 seconds 35.3 (H)  Immediate PTT 1:1 Mix <33.2 seconds 30.1  Incubated PTT 1:1 Mix <35.0 seconds 30.6  Thrombin Time <18.6 seconds 17.7  PT Sec 9.7 - 13.0 sec 10.9 10.4 11.4  PT INR 0.9 - 1.3 1.0 1.0 1.1  Beta 2 Glycoprotein, IgG <20 SGU <9  Beta 2 Glycoprotein 1, IgA <=20 MARIA ESTHER <10  Beta 2 Glycoprotein, IgM <20 SMU <9  Cardiolipin Ab, IgA <12.0 APL <9.0  Cardiolipin Ab, IgG <15.0 GPL <9.0  Cardiolipin Ab, IgM <12.5 MPL <9.0  APTT 23.0 - 32.4 sec 33.9 (H) 33.6 (H) 38.7 (H)  Factor IX:C Assay 77 - 173 % 142  Factor XI:C Assay 68 - 175 % 98  Factor XII C Assay 58 - 218 % 68  Factor VIII:C Assay 50 - 173 % 118    She was given nebulized tranexamic acid and transferred to [facility]. Her exam showed slight but not overt bleeding at the left tonsil site. Reflux and emesis was felt to be contributing to what was found to be a minimal  amount of bleeding. She signed out of the CDU AMA due to transportation issues. She's had no further bleeding.    She says her periods are now irregular and very heavy when they occur, lasting 5 days with the passage of clots. Some fatigue. She has frequent nausea and takes Zofran. She is . Femara was tried but she didn't ovulate.      Past Medical History:   Past Medical History:   Diagnosis Date    Abnormal reflex 2020    Hyperreflexia of lower extremity    Circadian rhythm sleep disorder, delayed sleep phase type 2020    Delayed sleep phase syndrome    Elevation of levels of liver transaminase levels 2018    Transaminitis    Encounter for immunization 2016    Immunization due    Headache, unspecified 2020    Chronic daily headache    Hypersomnia, unspecified 2018    Hypersomnia    Inadequate sleep hygiene 2018    Inadequate sleep hygiene    Insufficient sleep syndrome 2018    Insufficient sleep syndrome    Morbid (severe) obesity due to excess calories (Multi)     Class 3 severe obesity due to excess calories with serious comorbidity and body mass index (BMI) of 40.0 to 44.9 in adult    Other conditions influencing health status 10/13/2022    History of pregnancy    Other specified abnormal findings of blood chemistry 2018    Low vitamin D level    Other symptoms and signs involving the nervous system 2018    Suspected sleep apnea    Personal history of other diseases of the digestive system 2020    History of constipation    Personal history of other endocrine, nutritional and metabolic disease 2018    History of obesity    Personal history of other specified conditions 2019    History of chest pain    Personal history of other specified conditions 2019    History of palpitations    Personal history of other specified conditions 2019    History of urinary frequency    Personal history of other specified conditions  11/13/2019    History of diarrhea    Presence of spectacles and contact lenses 11/30/2016    Wears glasses     Social History:   Social History     Socioeconomic History    Marital status: Single     Spouse name: Not on file    Number of children: Not on file    Years of education: Not on file    Highest education level: Not on file   Occupational History    Not on file   Tobacco Use    Smoking status: Never     Passive exposure: Never    Smokeless tobacco: Never   Vaping Use    Vaping status: Never Used   Substance and Sexual Activity    Alcohol use: Never    Drug use: Never    Sexual activity: Not on file   Other Topics Concern    Not on file   Social History Narrative    Not on file     Social Determinants of Health     Financial Resource Strain: Medium Risk (1/26/2024)    Received from [facility]    Overall Financial Resource Strain (CARDIA)     Difficulty of Paying Living Expenses: Somewhat hard   Food Insecurity: Food Insecurity Present (4/9/2024)    Received from [facility]    Hunger Vital Sign     Worried About Running Out of Food in the Last Year: Never true     Ran Out of Food in the Last Year: Sometimes true   Transportation Needs: No Transportation Needs (1/26/2024)    Received from [facility]    PRAPARE - Transportation     Lack of Transportation (Medical): No     Lack of Transportation (Non-Medical): No   Physical Activity: Not on File (6/3/2021)    Received from [facility]     Physical Activity     Physical Activity: 0   Stress: No Stress Concern Present (9/3/2021)    Received from [facility]    [employer] [place] [employer] - Occupational Stress Questionnaire     Feeling of Stress : Not at all   Social Connections: Not at Risk (2/6/2023)    Received from [facility]     Social Connections     Social Connections and Isolation: 1   Intimate Partner Violence: Not on file   Housing Stability: Unknown (1/26/2024)    Received from [facility]    Housing Stability Vital Sign      "Unable to Pay for Housing in the Last Year: No     Number of Places Lived in the Last Year: Not on file     Unstable Housing in the Last Year: No     Surgical History: History reviewed. No pertinent surgical history.  Family History: No family history on file.   reports that she has never smoked. She has never been exposed to tobacco smoke. She has never used smokeless tobacco.  Oncology Family history: Cancer-related family history is not on file.    Review Of Systems:  As stated per in HPI; otherwise all other 12 point ROS are negative    Physical Exam:  /87 (BP Location: Left arm, Patient Position: Sitting, BP Cuff Size: Adult long)   Pulse 60   Temp 36.2 °C (97.2 °F) (Temporal)   Resp 18   Ht (S) 1.629 m (5' 4.13\")   Wt 120 kg (265 lb 6.9 oz)   SpO2 98%   BMI 45.37 kg/m²   BSA: 2.33 meters squared  General: awake/alert/oriented x3, no distress, alert and cooperative  Head: Short hair fully covering scalp. Symmetric facial expressions  Eyes: PERRL, EOMI, clear sclera, eyebrows present.  Ears/Nose/Mouth/Throat:  Oral mucous membranes moist. No oral ulcers. No palpable pre/post-auricular lymph nodes  Neck: No palpable cervical chain lymph nodes  Respiratory: unlabored breathing on room air, good chest expansion, thorax symmetric  Cardio: Regular rate and rhythm, normal S1 and S2, radial pulses symmetric  GI: Nondistended, soft, non-tender abdomen  Musculoskeletal: Normal muscle bulk and tone, ROM intact, no joint swelling.  Rises from chair and walks unassisted.  Extremities: No ankle swelling, no arm or leg wounds  Neuro: Alert, cognition intact, speech normal. Facial expressions symmetric.  No motor deficits noted. Sensation intact to touch and hot/cold.   Able to stand from seated position unassisted and walks around the room unassisted.  Psychological: Appropriate mood and behavior.  Skin: Warm and dry, no lesions, no rashes    Results:  Diagnostic Results   Lab Results   Component Value Date    " WBC 8.2 03/08/2024    HGB 13.4 03/08/2024    HCT 40.2 03/08/2024    MCV 93 03/08/2024     03/08/2024     Lab Results   Component Value Date    CALCIUM 9.3 03/08/2024     03/08/2024    K 4.0 03/08/2024    CO2 26 03/08/2024     03/08/2024    BUN 11 03/08/2024    CREATININE 0.80 03/08/2024    ALT 9 03/08/2024    AST 13 03/08/2024       Current Outpatient Medications:     valACYclovir (Valtrex) 500 mg tablet, Take 1 tablet (500 mg) by mouth once daily., Disp: , Rfl:      Radiology:    Pathology:    Assessment/Plan:  ? Elevated aPTT on 3 separate occasions with evidence of a lupus anticoagulant in 02/2024 (tested in [facility]). She had 3 miscarriages, has PCOS.    LA and GP and cardiolipin ab will be tested today. Results will be discussed in 4 weeks.    2- History of heavy menses and full correction in the aPTT mixing study.  This could be due to deficiency of one of the contact factors, high molecular weight kininogen or prekallikrein. Such deficiencies are rare and are not associated with bleeding although they do prolong the aPTT. They would not have been the cause of her post-tonsillectomy bleed. Even if low no further action will be necessary.     Diagnoses and all orders for this visit:  Lupus anticoagulant disorder (Multi)  -     APTT; Future  -     Lupus Anticoagulant With Interpretation [RIVERA]; Future  -     Protime-INR; Future  -     Lupus Anticoagulant With Interpretation [RIVERA]; Future  -     Clinic Appointment Request; Future  -     Vitamin B12; Future  Abnormal coagulation profile  -     Referral to Hematology and Oncology       Performance Status: Asymptomatic    I spent more than 30 minutes for the patient today, including face-to-face conversation, pre-visit preparation, post-visit orders, and others.   Mehnaz Venegas MD

## 2024-04-21 LAB
ANCA AB PATTERN SER IF-IMP: NORMAL
ANCA IGG TITR SER IF: NORMAL {TITER}
MYELOPEROXIDASE AB SER-ACNC: 0 AU/ML (ref 0–19)
PROTEINASE3 AB SER-ACNC: 0 AU/ML (ref 0–19)

## 2024-04-23 LAB
ALBUMIN: 3.9 G/DL (ref 3.4–5)
ALPHA 1 GLOBULIN: 0.3 G/DL (ref 0.2–0.6)
ALPHA 2 GLOBULIN: 0.8 G/DL (ref 0.4–1.1)
BETA GLOBULIN: 0.7 G/DL (ref 0.5–1.2)
GAMMA GLOBULIN: 1.4 G/DL (ref 0.5–1.4)
PATH REVIEW-SERUM PROTEIN ELECTROPHORESIS: NORMAL
PROTEIN ELECTROPHORESIS COMMENT: NORMAL
PYRIDOXAL PHOS SERPL-SCNC: 44.3 NMOL/L (ref 20–125)

## 2024-05-06 ENCOUNTER — LAB (OUTPATIENT)
Dept: LAB | Facility: LAB | Age: 24
End: 2024-05-06
Payer: MEDICAID

## 2024-05-06 DIAGNOSIS — D68.62 LUPUS ANTICOAGULANT DISORDER (MULTI): ICD-10-CM

## 2024-05-06 LAB — VIT B12 SERPL-MCNC: 447 PG/ML (ref 211–911)

## 2024-05-06 PROCEDURE — 36415 COLL VENOUS BLD VENIPUNCTURE: CPT

## 2024-05-06 PROCEDURE — 82607 VITAMIN B-12: CPT

## 2024-05-09 ENCOUNTER — TELEPHONE (OUTPATIENT)
Dept: HEMATOLOGY/ONCOLOGY | Facility: CLINIC | Age: 24
End: 2024-05-09
Payer: MEDICAID

## 2024-05-09 DIAGNOSIS — D68.62 LUPUS ANTICOAGULANT DISORDER (MULTI): Primary | ICD-10-CM

## 2024-05-15 ENCOUNTER — HOSPITAL ENCOUNTER (EMERGENCY)
Facility: HOSPITAL | Age: 24
Discharge: HOME | End: 2024-05-15
Payer: MEDICAID

## 2024-05-15 ENCOUNTER — APPOINTMENT (OUTPATIENT)
Dept: RADIOLOGY | Facility: HOSPITAL | Age: 24
End: 2024-05-15
Payer: MEDICAID

## 2024-05-15 ENCOUNTER — TELEPHONE (OUTPATIENT)
Dept: OBSTETRICS AND GYNECOLOGY | Facility: HOSPITAL | Age: 24
End: 2024-05-15
Payer: MEDICAID

## 2024-05-15 VITALS
HEIGHT: 64 IN | BODY MASS INDEX: 45.17 KG/M2 | TEMPERATURE: 98.6 F | OXYGEN SATURATION: 98 % | WEIGHT: 264.55 LBS | HEART RATE: 70 BPM | DIASTOLIC BLOOD PRESSURE: 67 MMHG | SYSTOLIC BLOOD PRESSURE: 124 MMHG | RESPIRATION RATE: 18 BRPM

## 2024-05-15 DIAGNOSIS — N93.9 VAGINAL BLEEDING: Primary | ICD-10-CM

## 2024-05-15 DIAGNOSIS — O36.80X0 PREGNANCY WITH UNCERTAIN FETAL VIABILITY, SINGLE OR UNSPECIFIED FETUS (HHS-HCC): Primary | ICD-10-CM

## 2024-05-15 DIAGNOSIS — Z34.90 INTRAUTERINE PREGNANCY (HHS-HCC): ICD-10-CM

## 2024-05-15 LAB
ABO GROUP (TYPE) IN BLOOD: NORMAL
ALBUMIN SERPL BCP-MCNC: 3.9 G/DL (ref 3.4–5)
ALP SERPL-CCNC: 46 U/L (ref 33–110)
ALT SERPL W P-5'-P-CCNC: 9 U/L (ref 7–45)
ANION GAP SERPL CALC-SCNC: 16 MMOL/L (ref 10–20)
ANTIBODY SCREEN: NORMAL
APPEARANCE UR: ABNORMAL
AST SERPL W P-5'-P-CCNC: 15 U/L (ref 9–39)
B-HCG SERPL-ACNC: 7297 MIU/ML
BASOPHILS # BLD AUTO: 0.03 X10*3/UL (ref 0–0.1)
BASOPHILS NFR BLD AUTO: 0.5 %
BILIRUB SERPL-MCNC: 0.4 MG/DL (ref 0–1.2)
BILIRUB UR STRIP.AUTO-MCNC: NEGATIVE MG/DL
BUN SERPL-MCNC: 7 MG/DL (ref 6–23)
CALCIUM SERPL-MCNC: 8.9 MG/DL (ref 8.6–10.6)
CHLORIDE SERPL-SCNC: 105 MMOL/L (ref 98–107)
CO2 SERPL-SCNC: 18 MMOL/L (ref 21–32)
COLOR UR: YELLOW
CREAT SERPL-MCNC: 0.58 MG/DL (ref 0.5–1.05)
EGFRCR SERPLBLD CKD-EPI 2021: >90 ML/MIN/1.73M*2
EOSINOPHIL # BLD AUTO: 0.26 X10*3/UL (ref 0–0.7)
EOSINOPHIL NFR BLD AUTO: 4.1 %
ERYTHROCYTE [DISTWIDTH] IN BLOOD BY AUTOMATED COUNT: 12.5 % (ref 11.5–14.5)
GLUCOSE SERPL-MCNC: 76 MG/DL (ref 74–99)
GLUCOSE UR STRIP.AUTO-MCNC: NORMAL MG/DL
HCT VFR BLD AUTO: 34.6 % (ref 36–46)
HCV AB SER QL: NONREACTIVE
HGB BLD-MCNC: 12.1 G/DL (ref 12–16)
HIV 1+2 AB+HIV1 P24 AG SERPL QL IA: NONREACTIVE
HOLD SPECIMEN: NORMAL
IMM GRANULOCYTES # BLD AUTO: 0.02 X10*3/UL (ref 0–0.7)
IMM GRANULOCYTES NFR BLD AUTO: 0.3 % (ref 0–0.9)
KETONES UR STRIP.AUTO-MCNC: ABNORMAL MG/DL
LEUKOCYTE ESTERASE UR QL STRIP.AUTO: ABNORMAL
LYMPHOCYTES # BLD AUTO: 2.84 X10*3/UL (ref 1.2–4.8)
LYMPHOCYTES NFR BLD AUTO: 44.4 %
MAGNESIUM SERPL-MCNC: 1.78 MG/DL (ref 1.6–2.4)
MCH RBC QN AUTO: 29.4 PG (ref 26–34)
MCHC RBC AUTO-ENTMCNC: 35 G/DL (ref 32–36)
MCV RBC AUTO: 84 FL (ref 80–100)
MONOCYTES # BLD AUTO: 0.92 X10*3/UL (ref 0.1–1)
MONOCYTES NFR BLD AUTO: 14.4 %
MUCOUS THREADS #/AREA URNS AUTO: NORMAL /LPF
NEUTROPHILS # BLD AUTO: 2.33 X10*3/UL (ref 1.2–7.7)
NEUTROPHILS NFR BLD AUTO: 36.3 %
NITRITE UR QL STRIP.AUTO: NEGATIVE
NRBC BLD-RTO: 0 /100 WBCS (ref 0–0)
PH UR STRIP.AUTO: 6.5 [PH]
PLATELET # BLD AUTO: 173 X10*3/UL (ref 150–450)
POTASSIUM SERPL-SCNC: 3.7 MMOL/L (ref 3.5–5.3)
PROT SERPL-MCNC: 7.3 G/DL (ref 6.4–8.2)
PROT UR STRIP.AUTO-MCNC: ABNORMAL MG/DL
RBC # BLD AUTO: 4.11 X10*6/UL (ref 4–5.2)
RBC # UR STRIP.AUTO: ABNORMAL /UL
RBC #/AREA URNS AUTO: NORMAL /HPF
RH FACTOR (ANTIGEN D): NORMAL
SODIUM SERPL-SCNC: 135 MMOL/L (ref 136–145)
SP GR UR STRIP.AUTO: 1.02
SQUAMOUS #/AREA URNS AUTO: NORMAL /HPF
TREPONEMA PALLIDUM IGG+IGM AB [PRESENCE] IN SERUM OR PLASMA BY IMMUNOASSAY: NONREACTIVE
UROBILINOGEN UR STRIP.AUTO-MCNC: NORMAL MG/DL
WBC # BLD AUTO: 6.4 X10*3/UL (ref 4.4–11.3)
WBC #/AREA URNS AUTO: NORMAL /HPF

## 2024-05-15 PROCEDURE — 86780 TREPONEMA PALLIDUM: CPT

## 2024-05-15 PROCEDURE — 99284 EMERGENCY DEPT VISIT MOD MDM: CPT

## 2024-05-15 PROCEDURE — 86901 BLOOD TYPING SEROLOGIC RH(D): CPT

## 2024-05-15 PROCEDURE — 99284 EMERGENCY DEPT VISIT MOD MDM: CPT | Mod: 25

## 2024-05-15 PROCEDURE — 80053 COMPREHEN METABOLIC PANEL: CPT

## 2024-05-15 PROCEDURE — 83735 ASSAY OF MAGNESIUM: CPT

## 2024-05-15 PROCEDURE — 85025 COMPLETE CBC W/AUTO DIFF WBC: CPT

## 2024-05-15 PROCEDURE — 76830 TRANSVAGINAL US NON-OB: CPT

## 2024-05-15 PROCEDURE — 2500000005 HC RX 250 GENERAL PHARMACY W/O HCPCS: Mod: SE

## 2024-05-15 PROCEDURE — 87389 HIV-1 AG W/HIV-1&-2 AB AG IA: CPT

## 2024-05-15 PROCEDURE — 86803 HEPATITIS C AB TEST: CPT

## 2024-05-15 PROCEDURE — 87086 URINE CULTURE/COLONY COUNT: CPT

## 2024-05-15 PROCEDURE — 81001 URINALYSIS AUTO W/SCOPE: CPT

## 2024-05-15 PROCEDURE — 84702 CHORIONIC GONADOTROPIN TEST: CPT

## 2024-05-15 RX ORDER — ACETAMINOPHEN 325 MG/1
975 TABLET ORAL ONCE
Status: COMPLETED | OUTPATIENT
Start: 2024-05-15 | End: 2024-05-15

## 2024-05-15 RX ORDER — ONDANSETRON 4 MG/1
4 TABLET, FILM COATED ORAL EVERY 6 HOURS
Qty: 12 TABLET | Refills: 0 | Status: SHIPPED | OUTPATIENT
Start: 2024-05-15 | End: 2024-05-18

## 2024-05-15 RX ORDER — ONDANSETRON 4 MG/1
4 TABLET, ORALLY DISINTEGRATING ORAL ONCE
Status: COMPLETED | OUTPATIENT
Start: 2024-05-15 | End: 2024-05-15

## 2024-05-15 RX ORDER — ACETAMINOPHEN 325 MG/1
650 TABLET ORAL EVERY 6 HOURS PRN
Qty: 20 TABLET | Refills: 0 | Status: SHIPPED | OUTPATIENT
Start: 2024-05-15 | End: 2024-05-20

## 2024-05-15 RX ADMIN — ONDANSETRON 4 MG: 4 TABLET, ORALLY DISINTEGRATING ORAL at 11:32

## 2024-05-15 RX ADMIN — ACETAMINOPHEN 975 MG: 325 TABLET ORAL at 11:31

## 2024-05-15 ASSESSMENT — LIFESTYLE VARIABLES
HAVE YOU EVER FELT YOU SHOULD CUT DOWN ON YOUR DRINKING: NO
EVER HAD A DRINK FIRST THING IN THE MORNING TO STEADY YOUR NERVES TO GET RID OF A HANGOVER: NO
TOTAL SCORE: 0
EVER FELT BAD OR GUILTY ABOUT YOUR DRINKING: NO
HAVE PEOPLE ANNOYED YOU BY CRITICIZING YOUR DRINKING: NO

## 2024-05-15 ASSESSMENT — PAIN - FUNCTIONAL ASSESSMENT: PAIN_FUNCTIONAL_ASSESSMENT: 0-10

## 2024-05-15 ASSESSMENT — PAIN DESCRIPTION - PROGRESSION: CLINICAL_PROGRESSION: NOT CHANGED

## 2024-05-15 ASSESSMENT — COLUMBIA-SUICIDE SEVERITY RATING SCALE - C-SSRS
6. HAVE YOU EVER DONE ANYTHING, STARTED TO DO ANYTHING, OR PREPARED TO DO ANYTHING TO END YOUR LIFE?: NO
2. HAVE YOU ACTUALLY HAD ANY THOUGHTS OF KILLING YOURSELF?: NO
1. IN THE PAST MONTH, HAVE YOU WISHED YOU WERE DEAD OR WISHED YOU COULD GO TO SLEEP AND NOT WAKE UP?: NO

## 2024-05-15 ASSESSMENT — PAIN SCALES - GENERAL: PAINLEVEL_OUTOF10: 0 - NO PAIN

## 2024-05-15 NOTE — Clinical Note
Aliyah Green was seen and treated in our emergency department on 5/15/2024.  She may return to work on 05/18/2024.       If you have any questions or concerns, please don't hesitate to call.      Manjinder Juarez, APRN-CNP

## 2024-05-15 NOTE — ED PROVIDER NOTES
HPI   Chief Complaint   Patient presents with   • Vaginal Bleeding - Pregnant       23-year-old female with history of HTN, lupus anticoagulation disorder, hirsutism, seizure disorder, PTSD, morbid obesity presents for chief complaint of vaginal bleeding and cramping with pregnancy.  Intermittent for the last 2 days.  Vaginal spotting.  Uses approximately 2 pads today.  G3, P0 with 2 miscarriages.  LMP 4/12/2024.  OB appointment yesterday where they found a slightly open cervix, per patient.  Denies any vaginal discharge.  States that they tested for STDs yesterday and results are pending.  Endorses some nausea without vomiting.  No changes in urination or bowel movement.  No chills or myalgia or known fevers.                           Miguel Coma Scale Score: 15                     Patient History   Past Medical History:   Diagnosis Date   • Abnormal reflex 01/07/2020    Hyperreflexia of lower extremity   • Circadian rhythm sleep disorder, delayed sleep phase type 04/03/2020    Delayed sleep phase syndrome   • Elevation of levels of liver transaminase levels 11/08/2018    Transaminitis   • Encounter for immunization 11/30/2016    Immunization due   • Headache, unspecified 01/07/2020    Chronic daily headache   • Hypersomnia, unspecified 02/21/2018    Hypersomnia   • Inadequate sleep hygiene 02/21/2018    Inadequate sleep hygiene   • Insufficient sleep syndrome 02/21/2018    Insufficient sleep syndrome   • Morbid (severe) obesity due to excess calories (Multi)     Class 3 severe obesity due to excess calories with serious comorbidity and body mass index (BMI) of 40.0 to 44.9 in adult   • Other conditions influencing health status 10/13/2022    History of pregnancy   • Other specified abnormal findings of blood chemistry 11/05/2018    Low vitamin D level   • Other symptoms and signs involving the nervous system 01/17/2018    Suspected sleep apnea   • Personal history of other diseases of the digestive system  05/06/2020    History of constipation   • Personal history of other endocrine, nutritional and metabolic disease 11/05/2018    History of obesity   • Personal history of other specified conditions 11/14/2019    History of chest pain   • Personal history of other specified conditions 04/23/2019    History of palpitations   • Personal history of other specified conditions 11/13/2019    History of urinary frequency   • Personal history of other specified conditions 11/13/2019    History of diarrhea   • Presence of spectacles and contact lenses 11/30/2016    Wears glasses     No past surgical history on file.  No family history on file.  Social History     Tobacco Use   • Smoking status: Never     Passive exposure: Never   • Smokeless tobacco: Never   Vaping Use   • Vaping status: Never Used   Substance Use Topics   • Alcohol use: Never   • Drug use: Never       Physical Exam   ED Triage Vitals [05/15/24 0947]   Temperature Heart Rate Respirations BP   36.3 °C (97.3 °F) 78 16 118/77      Pulse Ox Temp src Heart Rate Source Patient Position   99 % -- -- Sitting      BP Location FiO2 (%)     Right arm --       Physical Exam  Exam conducted with a chaperone present.   Constitutional:       Appearance: Normal appearance.   HENT:      Head: Normocephalic and atraumatic.      Mouth/Throat:      Mouth: Mucous membranes are moist.      Pharynx: Oropharynx is clear.   Eyes:      Extraocular Movements: Extraocular movements intact.      Conjunctiva/sclera: Conjunctivae normal.      Pupils: Pupils are equal, round, and reactive to light.   Cardiovascular:      Rate and Rhythm: Normal rate and regular rhythm.      Pulses: Normal pulses.      Heart sounds: Normal heart sounds.   Pulmonary:      Effort: Pulmonary effort is normal.      Breath sounds: Normal breath sounds.   Abdominal:      General: Abdomen is flat.      Palpations: Abdomen is soft.      Tenderness: There is no abdominal tenderness.   Genitourinary:     Exam position:  Lithotomy position.      Vagina: No tenderness or bleeding.      Cervix: No discharge or cervical bleeding.   Musculoskeletal:         General: Normal range of motion.      Cervical back: Normal range of motion and neck supple.   Skin:     General: Skin is warm and dry.      Capillary Refill: Capillary refill takes less than 2 seconds.   Neurological:      General: No focal deficit present.      Mental Status: She is alert and oriented to person, place, and time.   Psychiatric:         Mood and Affect: Mood normal.         Behavior: Behavior normal.         Thought Content: Thought content normal.         Judgment: Judgment normal.         ED Course & MDM   Diagnoses as of 05/15/24 1535   Vaginal bleeding   Intrauterine pregnancy ([facility])       Medical Decision Making  Vital signs reviewed, unremarkable at this time.  Patient is well-appearing and in no apparent distress.  Speaks in full sentences without difficulty.  Diagnostic testing performed.  Zofran and Tylenol given for symptom management pelvic exam reassuring with only mild white discharge but nothing significant.  No bleeding.  Cervical os appeared closed.  No tenderness.  With report of vaginal bleeding with pregnancy that is early, we will get formal ultrasound.  Also will wait for hCG beta quant as well as type and screen, and H&H. CBC with differential, CMP, magnesium within normal ranges and unremarkable.  HIV, hepatitis C not detected on test.  hCG beta quant elevated over 7000.  Type and screen O+.  No need for RhoGAM.  Urinalysis shows elevated leukocyte esterase but no bacteria and no elevation in WBC.  Pelvic ultrasound with transvaginal shows single IUP approximately 5 weeks 4 days gestational age.  No fetal pole identified on this, likely due to early pregnancy.  I was able to reach out to OB/GYN team.  They feel she has an IUP by gestational sac and yolk sac so does not need the beta book.  They will schedule viability ultrasound and prenatal  visit in 2 weeks.  On reevaluation of the patient the patient endorses feeling improved.  I relayed the findings to the patient and advised to follow-up as scheduled.  Phone number for women's health clinic given just in case.  No bleeding currently.  Advised to follow-up with soon as possible and to return with any new or worsening symptoms.  Patient in agreement with this plan.  Work note given.  Discharged in stable condition.        Procedure  Procedures     Manjinder Juarez, CARRIE-CNP  05/15/24 2683

## 2024-05-16 ENCOUNTER — TELEPHONE (OUTPATIENT)
Dept: HEMATOLOGY/ONCOLOGY | Facility: CLINIC | Age: 24
End: 2024-05-16
Payer: MEDICAID

## 2024-05-16 ENCOUNTER — DOCUMENTATION (OUTPATIENT)
Dept: EMERGENCY MEDICINE | Facility: HOSPITAL | Age: 24
End: 2024-05-16
Payer: MEDICAID

## 2024-05-16 LAB — BACTERIA UR CULT: NORMAL

## 2024-05-16 NOTE — RESEARCH NOTES
4:30 PM    Test:   Discussed HIV/HCV testing with the patient in the ED.   Patient received [HIV and HCV] test today    Test Result:  HIV [Negative]  HCV [Negative]    Result notification:  Patient was notified of their test results on [05-] via phone after verifying 3 patient identifiers]    Follow-up plan:   Patient was referred to [No Referral] on [Date]  Patient has appointment at [No Appointment on [Date]  Barriers to attending appointment: [None]  Missed appointments: [None]    Signed  Maco Malik Jr.  HIV/HCV FOCUS Program  Department of Emergency Medicine - Research  Please contact me via email or Epic Chat with questions

## 2024-06-11 ENCOUNTER — APPOINTMENT (OUTPATIENT)
Dept: OBSTETRICS AND GYNECOLOGY | Facility: CLINIC | Age: 24
End: 2024-06-11
Payer: MEDICAID

## 2024-06-28 ENCOUNTER — APPOINTMENT (OUTPATIENT)
Dept: PRIMARY CARE | Facility: CLINIC | Age: 24
End: 2024-06-28
Payer: MEDICAID

## 2024-07-26 ENCOUNTER — APPOINTMENT (OUTPATIENT)
Dept: ENDOCRINOLOGY | Facility: CLINIC | Age: 24
End: 2024-07-26
Payer: MEDICAID

## 2024-08-06 ENCOUNTER — HOSPITAL ENCOUNTER (OUTPATIENT)
Facility: HOSPITAL | Age: 24
Discharge: HOME | End: 2024-08-06
Attending: SPECIALIST | Admitting: SPECIALIST
Payer: MEDICAID

## 2024-08-06 VITALS
SYSTOLIC BLOOD PRESSURE: 109 MMHG | TEMPERATURE: 97.7 F | HEART RATE: 71 BPM | RESPIRATION RATE: 16 BRPM | DIASTOLIC BLOOD PRESSURE: 68 MMHG | OXYGEN SATURATION: 100 %

## 2024-08-06 DIAGNOSIS — O26.899 VAGINAL DISCHARGE DURING PREGNANCY, ANTEPARTUM (HHS-HCC): Primary | ICD-10-CM

## 2024-08-06 DIAGNOSIS — N89.8 VAGINAL DISCHARGE DURING PREGNANCY, ANTEPARTUM (HHS-HCC): Primary | ICD-10-CM

## 2024-08-06 LAB
CLUE CELLS SPEC QL WET PREP: NORMAL
POC APPEARANCE, URINE: CLEAR
POC BILIRUBIN, URINE: ABNORMAL
POC BLOOD, URINE: NEGATIVE
POC COLOR, URINE: YELLOW
POC GLUCOSE, URINE: NEGATIVE MG/DL
POC KETONES, URINE: ABNORMAL MG/DL
POC LEUKOCYTES, URINE: ABNORMAL
POC NITRITE,URINE: NEGATIVE
POC PH, URINE: 5.5 PH
POC PROTEIN, URINE: ABNORMAL MG/DL
POC SPECIFIC GRAVITY, URINE: >=1.03
POC UROBILINOGEN, URINE: 0.2 EU/DL
T VAGINALIS SPEC QL WET PREP: NORMAL
WBC VAG QL WET PREP: NORMAL
YEAST VAG QL WET PREP: NORMAL

## 2024-08-06 PROCEDURE — 99215 OFFICE O/P EST HI 40 MIN: CPT

## 2024-08-06 PROCEDURE — 99213 OFFICE O/P EST LOW 20 MIN: CPT | Performed by: FAMILY MEDICINE

## 2024-08-06 PROCEDURE — 87491 CHLMYD TRACH DNA AMP PROBE: CPT | Performed by: FAMILY MEDICINE

## 2024-08-06 PROCEDURE — 2500000001 HC RX 250 WO HCPCS SELF ADMINISTERED DRUGS (ALT 637 FOR MEDICARE OP): Performed by: FAMILY MEDICINE

## 2024-08-06 PROCEDURE — 4500999001 HC ED NO CHARGE

## 2024-08-06 PROCEDURE — 87210 SMEAR WET MOUNT SALINE/INK: CPT | Performed by: FAMILY MEDICINE

## 2024-08-06 PROCEDURE — 87661 TRICHOMONAS VAGINALIS AMPLIF: CPT | Performed by: FAMILY MEDICINE

## 2024-08-06 RX ORDER — LIDOCAINE HYDROCHLORIDE 10 MG/ML
0.5 INJECTION INFILTRATION; PERINEURAL ONCE AS NEEDED
Status: DISCONTINUED | OUTPATIENT
Start: 2024-08-06 | End: 2024-08-06 | Stop reason: HOSPADM

## 2024-08-06 RX ORDER — NIFEDIPINE 10 MG/1
10 CAPSULE ORAL ONCE AS NEEDED
Status: DISCONTINUED | OUTPATIENT
Start: 2024-08-06 | End: 2024-08-06 | Stop reason: HOSPADM

## 2024-08-06 RX ORDER — CAFFEINE 200 MG
100 TABLET ORAL ONCE
Status: COMPLETED | OUTPATIENT
Start: 2024-08-06 | End: 2024-08-06

## 2024-08-06 RX ORDER — LABETALOL HYDROCHLORIDE 5 MG/ML
20 INJECTION, SOLUTION INTRAVENOUS ONCE AS NEEDED
Status: DISCONTINUED | OUTPATIENT
Start: 2024-08-06 | End: 2024-08-06 | Stop reason: HOSPADM

## 2024-08-06 RX ORDER — HYDRALAZINE HYDROCHLORIDE 20 MG/ML
5 INJECTION INTRAMUSCULAR; INTRAVENOUS ONCE AS NEEDED
Status: DISCONTINUED | OUTPATIENT
Start: 2024-08-06 | End: 2024-08-06 | Stop reason: HOSPADM

## 2024-08-06 RX ORDER — METRONIDAZOLE 500 MG/1
500 TABLET ORAL 2 TIMES DAILY
Qty: 14 TABLET | Refills: 0 | Status: SHIPPED | OUTPATIENT
Start: 2024-08-06 | End: 2024-08-13

## 2024-08-06 RX ORDER — ACETAMINOPHEN 325 MG/1
975 TABLET ORAL ONCE
Status: COMPLETED | OUTPATIENT
Start: 2024-08-06 | End: 2024-08-06

## 2024-08-06 SDOH — SOCIAL STABILITY: SOCIAL INSECURITY: ARE THERE ANY APPARENT SIGNS OF INJURIES/BEHAVIORS THAT COULD BE RELATED TO ABUSE/NEGLECT?: NO

## 2024-08-06 SDOH — HEALTH STABILITY: MENTAL HEALTH: NON-SPECIFIC ACTIVE SUICIDAL THOUGHTS (PAST 1 MONTH): NO

## 2024-08-06 SDOH — SOCIAL STABILITY: SOCIAL INSECURITY: VERBAL ABUSE: DENIES

## 2024-08-06 SDOH — SOCIAL STABILITY: SOCIAL INSECURITY: HAS ANYONE EVER THREATENED TO HURT YOUR FAMILY OR YOUR PETS?: NO

## 2024-08-06 SDOH — HEALTH STABILITY: MENTAL HEALTH: SUICIDAL BEHAVIOR (LIFETIME): NO

## 2024-08-06 SDOH — SOCIAL STABILITY: SOCIAL INSECURITY: DO YOU FEEL ANYONE HAS EXPLOITED OR TAKEN ADVANTAGE OF YOU FINANCIALLY OR OF YOUR PERSONAL PROPERTY?: NO

## 2024-08-06 SDOH — SOCIAL STABILITY: SOCIAL INSECURITY: HAVE YOU HAD THOUGHTS OF HARMING ANYONE ELSE?: NO

## 2024-08-06 SDOH — ECONOMIC STABILITY: HOUSING INSECURITY: DO YOU FEEL UNSAFE GOING BACK TO THE PLACE WHERE YOU ARE LIVING?: NO

## 2024-08-06 SDOH — HEALTH STABILITY: MENTAL HEALTH: HAVE YOU USED ANY SUBSTANCES (CANABIS, COCAINE, HEROIN, HALLUCINOGENS, INHALANTS, ETC.) IN THE PAST 12 MONTHS?: NO

## 2024-08-06 SDOH — SOCIAL STABILITY: SOCIAL INSECURITY: ARE YOU OR HAVE YOU BEEN THREATENED OR ABUSED PHYSICALLY, EMOTIONALLY, OR SEXUALLY BY ANYONE?: NO

## 2024-08-06 SDOH — HEALTH STABILITY: MENTAL HEALTH: HAVE YOU USED ANY PRESCRIPTION DRUGS OTHER THAN PRESCRIBED IN THE PAST 12 MONTHS?: NO

## 2024-08-06 SDOH — HEALTH STABILITY: MENTAL HEALTH: STRENGTHS (MUST CHOOSE TWO): SUPPORT FROM FAMILY;SUPPORT FROM FRIENDS

## 2024-08-06 SDOH — SOCIAL STABILITY: SOCIAL INSECURITY: DOES ANYONE TRY TO KEEP YOU FROM HAVING/CONTACTING OTHER FRIENDS OR DOING THINGS OUTSIDE YOUR HOME?: NO

## 2024-08-06 SDOH — SOCIAL STABILITY: SOCIAL INSECURITY: HAVE YOU HAD ANY THOUGHTS OF HARMING ANYONE ELSE?: NO

## 2024-08-06 SDOH — SOCIAL STABILITY: SOCIAL INSECURITY: ABUSE SCREEN: ADULT

## 2024-08-06 SDOH — HEALTH STABILITY: MENTAL HEALTH: WERE YOU ABLE TO COMPLETE ALL THE BEHAVIORAL HEALTH SCREENINGS?: YES

## 2024-08-06 SDOH — HEALTH STABILITY: MENTAL HEALTH: WISH TO BE DEAD (PAST 1 MONTH): NO

## 2024-08-06 SDOH — SOCIAL STABILITY: SOCIAL INSECURITY: PHYSICAL ABUSE: DENIES

## 2024-08-06 ASSESSMENT — PAIN SCALES - GENERAL
PAINLEVEL_OUTOF10: 6
PAINLEVEL_OUTOF10: 10 - WORST POSSIBLE PAIN
PAINLEVEL_OUTOF10: 0 - NO PAIN

## 2024-08-06 ASSESSMENT — LIFESTYLE VARIABLES
AUDIT-C TOTAL SCORE: 0
HOW OFTEN DO YOU HAVE 6 OR MORE DRINKS ON ONE OCCASION: NEVER
AUDIT-C TOTAL SCORE: 0
HOW OFTEN DO YOU HAVE A DRINK CONTAINING ALCOHOL: NEVER
HOW MANY STANDARD DRINKS CONTAINING ALCOHOL DO YOU HAVE ON A TYPICAL DAY: PATIENT DOES NOT DRINK
SKIP TO QUESTIONS 9-10: 1

## 2024-08-06 ASSESSMENT — PATIENT HEALTH QUESTIONNAIRE - PHQ9
SUM OF ALL RESPONSES TO PHQ9 QUESTIONS 1 & 2: 0
1. LITTLE INTEREST OR PLEASURE IN DOING THINGS: NOT AT ALL
2. FEELING DOWN, DEPRESSED OR HOPELESS: NOT AT ALL

## 2024-08-06 NOTE — H&P
Obstetrical TRIAGE History and Physical     Aliyah Green is a 24 y.o. . 16w4d ga dated by LMP c/w 5w4d US. Receives prenatal care at [facility]. Presents to OB triage for abd pain.    Chief Complaint: Abdominal Pain    Assessment/Plan      Suprapubic abd pain/pressure with urination  - UA dip: 2+ ketones, SG >1.030, 1+ protein, 1+ leukocytes  - SSE: discharge c/w possible BV, will treat empirically   - wet prep, GC/CT, trich sent -> will call with abnl results  - history indicated cerclage in place; not on tension, cervix visually closed    Migraine  - h/o migraines with photophobia  - Tylenol, Caffeine, food -> improvement     IUP at 16w4d  -   - Continue routine prenatal care   - Next appt     Maternal Well-being  - Vital signs stable and WNL   - All questions and concerns addressed    Dispo  - patient appropriate for d/c home, agrees with plan   - f/u at next scheduled OB appt or to triage sooner as needed     Discussed plan and reviewed tracing with Dr. Clara Perez, APRN-CNP        Active Problems:  There are no active Hospital Problems.      Pregnancy Problems (from 24 to present)       No problems associated with this episode.          Subjective   Aliyah is here for suprapubic/lower abdominal pain. States discomfort started about 2 weeks ago, believed she may have UTI. Went to urgent care, had urine and vaginal swabs collected. She reports was told she was negative for UTI, STD but she was + for yeast. Given 1 dose of Diflucan with no improvement in sx. She denies vaginal discharge, itching, dysuria, hematuria, flank pain, fever/chills. She does notice suprapubic pain worsens when bladder is full and when starting urination.    H/o 17w loss with history indicated cerclage in place since 7/15. She had US on  which showed no funneling and 25mm cervical length. Denies loss of fluid, vaginal bleeding, contractions.         Obstetrical History   OB History    Para Term   AB Living   3       2     SAB IAB Ectopic Multiple Live Births   2              # Outcome Date GA Lbr Stevo/2nd Weight Sex Type Anes PTL Lv   3 Current            2 SAB            1 SAB                Past Medical History  Past Medical History:   Diagnosis Date    Abnormal reflex 2020    Hyperreflexia of lower extremity    Circadian rhythm sleep disorder, delayed sleep phase type 2020    Delayed sleep phase syndrome    Elevation of levels of liver transaminase levels 2018    Transaminitis    Encounter for immunization 2016    Immunization due    Headache, unspecified 2020    Chronic daily headache    Hypersomnia, unspecified 2018    Hypersomnia    Inadequate sleep hygiene 2018    Inadequate sleep hygiene    Insufficient sleep syndrome 2018    Insufficient sleep syndrome    Morbid (severe) obesity due to excess calories (Multi)     Class 3 severe obesity due to excess calories with serious comorbidity and body mass index (BMI) of 40.0 to 44.9 in adult    Other conditions influencing health status 10/13/2022    History of pregnancy    Other specified abnormal findings of blood chemistry 2018    Low vitamin D level    Other symptoms and signs involving the nervous system 2018    Suspected sleep apnea    Personal history of other diseases of the digestive system 2020    History of constipation    Personal history of other endocrine, nutritional and metabolic disease 2018    History of obesity    Personal history of other specified conditions 2019    History of chest pain    Personal history of other specified conditions 2019    History of palpitations    Personal history of other specified conditions 2019    History of urinary frequency    Personal history of other specified conditions 2019    History of diarrhea    Presence of spectacles and contact lenses 2016    Wears glasses        Past Surgical History   No  past surgical history on file.    Social History  Social History     Tobacco Use    Smoking status: Never     Passive exposure: Never    Smokeless tobacco: Never   Substance Use Topics    Alcohol use: Never     Substance and Sexual Activity   Drug Use Never       Allergies  Codeine, Amoxicillin, and Penicillins     Medications  Medications Prior to Admission   Medication Sig Dispense Refill Last Dose    valACYclovir (Valtrex) 500 mg tablet Take 1 tablet (500 mg) by mouth once daily.   8/5/2024       Objective    Last Vitals  Temp Pulse Resp BP MAP O2 Sat   36.5 °C (97.7 °F) 68 16 108/63 79 100 %     Physical Examination  Physical Exam  Constitutional:       Appearance: Normal appearance.   HENT:      Head: Normocephalic.   Cardiovascular:      Rate and Rhythm: Normal rate.   Pulmonary:      Effort: Pulmonary effort is normal.   Abdominal:      Palpations: Abdomen is soft.      Tenderness: There is abdominal tenderness in the suprapubic area. There is no right CVA tenderness or left CVA tenderness.   Genitourinary:     Comments:  per BSUS by Dr Elizabeth    SSE: white/grey discharge in vaginal vault, cerclage knot visualized at 12oclock, not on tension  Skin:     General: Skin is warm.   Neurological:      Mental Status: She is alert.   Psychiatric:         Mood and Affect: Mood normal.         Behavior: Behavior normal.       Lab Review  Admission on 08/06/2024   Component Date Value Ref Range Status    POC Color, Urine 08/06/2024 Yellow  Straw, Yellow, Light-Yellow In process    POC Appearance, Urine 08/06/2024 Clear  Clear In process    POC Glucose, Urine 08/06/2024 NEGATIVE  NEGATIVE mg/dl In process    POC Bilirubin, Urine 08/06/2024 SMALL (1+) (A)  NEGATIVE In process    POC Ketones, Urine 08/06/2024 40 (2+) (A)  NEGATIVE mg/dl In process    POC Specific Gravity, Urine 08/06/2024 >=1.030  1.005 - 1.035 In process    POC Blood, Urine 08/06/2024 NEGATIVE  NEGATIVE In process    POC PH, Urine 08/06/2024  5.5  No Reference Range Established PH In process    POC Protein, Urine 08/06/2024 15 (1+) (A)  NEGATIVE, 30 (1+) mg/dl In process    POC Urobilinogen, Urine 08/06/2024 0.2  0.2, 1.0 EU/DL In process    Poc Nitrite, Urine 08/06/2024 NEGATIVE  NEGATIVE In process    POC Leukocytes, Urine 08/06/2024 SMALL (1+) (A)  NEGATIVE In process

## 2024-08-07 LAB
C TRACH RRNA SPEC QL NAA+PROBE: NEGATIVE
N GONORRHOEA DNA SPEC QL PROBE+SIG AMP: NEGATIVE
T VAGINALIS RRNA SPEC QL NAA+PROBE: NEGATIVE

## 2025-03-07 ENCOUNTER — HOSPITAL ENCOUNTER (EMERGENCY)
Facility: HOSPITAL | Age: 25
Discharge: HOME | End: 2025-03-07
Attending: EMERGENCY MEDICINE
Payer: MEDICAID

## 2025-03-07 VITALS
WEIGHT: 230 LBS | BODY MASS INDEX: 36.1 KG/M2 | OXYGEN SATURATION: 96 % | SYSTOLIC BLOOD PRESSURE: 126 MMHG | DIASTOLIC BLOOD PRESSURE: 81 MMHG | TEMPERATURE: 97.9 F | HEIGHT: 67 IN | RESPIRATION RATE: 16 BRPM | HEART RATE: 63 BPM

## 2025-03-07 DIAGNOSIS — K08.89 PAIN, DENTAL: Primary | ICD-10-CM

## 2025-03-07 DIAGNOSIS — R68.89 FLU-LIKE SYMPTOMS: ICD-10-CM

## 2025-03-07 LAB
ALBUMIN SERPL BCP-MCNC: 4.3 G/DL (ref 3.4–5)
ALP SERPL-CCNC: 56 U/L (ref 33–110)
ALT SERPL W P-5'-P-CCNC: 11 U/L (ref 7–45)
ANION GAP SERPL CALC-SCNC: 11 MMOL/L (ref 10–20)
APAP SERPL-MCNC: <10 UG/ML
APPEARANCE UR: ABNORMAL
AST SERPL W P-5'-P-CCNC: 18 U/L (ref 9–39)
BASOPHILS # BLD AUTO: 0.04 X10*3/UL (ref 0–0.1)
BASOPHILS NFR BLD AUTO: 0.5 %
BILIRUB SERPL-MCNC: 0.4 MG/DL (ref 0–1.2)
BILIRUB UR STRIP.AUTO-MCNC: NEGATIVE MG/DL
BUN SERPL-MCNC: 15 MG/DL (ref 6–23)
CALCIUM SERPL-MCNC: 9.4 MG/DL (ref 8.6–10.6)
CHLORIDE SERPL-SCNC: 108 MMOL/L (ref 98–107)
CO2 SERPL-SCNC: 24 MMOL/L (ref 21–32)
COLOR UR: ABNORMAL
CREAT SERPL-MCNC: 0.85 MG/DL (ref 0.5–1.05)
EGFRCR SERPLBLD CKD-EPI 2021: >90 ML/MIN/1.73M*2
EOSINOPHIL # BLD AUTO: 0.2 X10*3/UL (ref 0–0.7)
EOSINOPHIL NFR BLD AUTO: 2.6 %
ERYTHROCYTE [DISTWIDTH] IN BLOOD BY AUTOMATED COUNT: 12.7 % (ref 11.5–14.5)
FLUAV RNA RESP QL NAA+PROBE: NOT DETECTED
FLUBV RNA RESP QL NAA+PROBE: NOT DETECTED
GLUCOSE SERPL-MCNC: 65 MG/DL (ref 74–99)
GLUCOSE UR STRIP.AUTO-MCNC: NORMAL MG/DL
HCT VFR BLD AUTO: 34.7 % (ref 36–46)
HGB BLD-MCNC: 11.4 G/DL (ref 12–16)
IMM GRANULOCYTES # BLD AUTO: 0.02 X10*3/UL (ref 0–0.7)
IMM GRANULOCYTES NFR BLD AUTO: 0.3 % (ref 0–0.9)
KETONES UR STRIP.AUTO-MCNC: NEGATIVE MG/DL
LEUKOCYTE ESTERASE UR QL STRIP.AUTO: ABNORMAL
LYMPHOCYTES # BLD AUTO: 2.94 X10*3/UL (ref 1.2–4.8)
LYMPHOCYTES NFR BLD AUTO: 38.8 %
MCH RBC QN AUTO: 29.5 PG (ref 26–34)
MCHC RBC AUTO-ENTMCNC: 32.9 G/DL (ref 32–36)
MCV RBC AUTO: 90 FL (ref 80–100)
MONOCYTES # BLD AUTO: 0.67 X10*3/UL (ref 0.1–1)
MONOCYTES NFR BLD AUTO: 8.9 %
MUCOUS THREADS #/AREA URNS AUTO: ABNORMAL /LPF
NEUTROPHILS # BLD AUTO: 3.7 X10*3/UL (ref 1.2–7.7)
NEUTROPHILS NFR BLD AUTO: 48.9 %
NITRITE UR QL STRIP.AUTO: NEGATIVE
NRBC BLD-RTO: 0 /100 WBCS (ref 0–0)
PH UR STRIP.AUTO: 5.5 [PH]
PLATELET # BLD AUTO: 328 X10*3/UL (ref 150–450)
POTASSIUM SERPL-SCNC: 3.8 MMOL/L (ref 3.5–5.3)
PROT SERPL-MCNC: 7.7 G/DL (ref 6.4–8.2)
PROT UR STRIP.AUTO-MCNC: ABNORMAL MG/DL
RBC # BLD AUTO: 3.86 X10*6/UL (ref 4–5.2)
RBC # UR STRIP.AUTO: ABNORMAL MG/DL
RBC #/AREA URNS AUTO: >20 /HPF
SARS-COV-2 RNA RESP QL NAA+PROBE: NOT DETECTED
SODIUM SERPL-SCNC: 139 MMOL/L (ref 136–145)
SP GR UR STRIP.AUTO: 1.01
SQUAMOUS #/AREA URNS AUTO: ABNORMAL /HPF
TRANS CELLS #/AREA UR COMP ASSIST: ABNORMAL /HPF
UROBILINOGEN UR STRIP.AUTO-MCNC: NORMAL MG/DL
WBC # BLD AUTO: 7.6 X10*3/UL (ref 4.4–11.3)
WBC #/AREA URNS AUTO: >50 /HPF
WBC CLUMPS #/AREA URNS AUTO: ABNORMAL /HPF

## 2025-03-07 PROCEDURE — 36415 COLL VENOUS BLD VENIPUNCTURE: CPT

## 2025-03-07 PROCEDURE — 99285 EMERGENCY DEPT VISIT HI MDM: CPT | Performed by: EMERGENCY MEDICINE

## 2025-03-07 PROCEDURE — 99283 EMERGENCY DEPT VISIT LOW MDM: CPT | Performed by: EMERGENCY MEDICINE

## 2025-03-07 PROCEDURE — 85025 COMPLETE CBC W/AUTO DIFF WBC: CPT

## 2025-03-07 PROCEDURE — 87636 SARSCOV2 & INF A&B AMP PRB: CPT | Performed by: EMERGENCY MEDICINE

## 2025-03-07 PROCEDURE — 36410 VNPNXR 3YR/> PHY/QHP DX/THER: CPT

## 2025-03-07 PROCEDURE — 81001 URINALYSIS AUTO W/SCOPE: CPT | Performed by: EMERGENCY MEDICINE

## 2025-03-07 PROCEDURE — 87077 CULTURE AEROBIC IDENTIFY: CPT | Performed by: EMERGENCY MEDICINE

## 2025-03-07 PROCEDURE — 80053 COMPREHEN METABOLIC PANEL: CPT

## 2025-03-07 PROCEDURE — 80143 DRUG ASSAY ACETAMINOPHEN: CPT

## 2025-03-07 RX ORDER — AZITHROMYCIN 500 MG/1
1000 TABLET, FILM COATED ORAL ONCE
Status: DISCONTINUED | OUTPATIENT
Start: 2025-03-07 | End: 2025-03-07

## 2025-03-07 RX ORDER — METRONIDAZOLE 500 MG/1
2000 TABLET ORAL ONCE
Status: DISCONTINUED | OUTPATIENT
Start: 2025-03-07 | End: 2025-03-07

## 2025-03-07 RX ORDER — CEFTRIAXONE 500 MG/1
500 INJECTION, POWDER, FOR SOLUTION INTRAMUSCULAR; INTRAVENOUS ONCE
Status: DISCONTINUED | OUTPATIENT
Start: 2025-03-07 | End: 2025-03-07

## 2025-03-07 ASSESSMENT — COLUMBIA-SUICIDE SEVERITY RATING SCALE - C-SSRS
2. HAVE YOU ACTUALLY HAD ANY THOUGHTS OF KILLING YOURSELF?: NO
1. IN THE PAST MONTH, HAVE YOU WISHED YOU WERE DEAD OR WISHED YOU COULD GO TO SLEEP AND NOT WAKE UP?: NO
6. HAVE YOU EVER DONE ANYTHING, STARTED TO DO ANYTHING, OR PREPARED TO DO ANYTHING TO END YOUR LIFE?: NO

## 2025-03-07 ASSESSMENT — PAIN SCALES - GENERAL: PAINLEVEL_OUTOF10: 6

## 2025-03-07 ASSESSMENT — PAIN - FUNCTIONAL ASSESSMENT: PAIN_FUNCTIONAL_ASSESSMENT: 0-10

## 2025-03-07 NOTE — ED PROCEDURE NOTE
There was difficulty obtaining IV access on this patient, and multiple attempts by nursing staff and/or medics have failed. Patient required IV access by ED provider under the assistance of ultrasound.      Site was prepped and sterilized before IV insertion.     Ultrasound images were not saved in the patient's chart demonstrating cannulization.     IV Size: 20  Anatomic Side: Left  IV Site: Antecubital Fossa    Sonny Webber DO   Emergency Medicine PGY-2         Sonny Webber DO  Resident  03/07/25 2861

## 2025-03-07 NOTE — ED PROVIDER NOTES
CC: Flu Symptoms     History provided by: Patient  Limitations to History: None    HPI:  ***    External Records Reviewed: Previous ED records, inpatient records, outpatient records   ???????????????????????????????????????????????????????????????  Triage Vitals:  T 36.6 °C (97.9 °F)  HR 63  /81  RR 16  O2 96 %      Physical Exam  Constitutional:       General: She is awake. She is not in acute distress.     Appearance: She is not ill-appearing, toxic-appearing or diaphoretic.   HENT:      Head: Normocephalic and atraumatic.      Jaw: No trismus.      Mouth/Throat:      Comments: No erythema or lesions in oropharynx. Chipped tooth number 17. No evidence of periapical abscess. No submandibular friable tissue.   Cardiovascular:      Rate and Rhythm: Normal rate and regular rhythm.      Pulses:           Radial pulses are 2+ on the right side and 2+ on the left side.        Dorsalis pedis pulses are 2+ on the right side and 2+ on the left side.      Heart sounds: S1 normal and S2 normal. Heart sounds not distant. No murmur heard.     No friction rub.   Pulmonary:      Effort: Pulmonary effort is normal. No respiratory distress.      Breath sounds: Normal breath sounds.      Comments: No wheezing or crackles  Abdominal:      General: Abdomen is protuberant. There is no distension.      Palpations: Abdomen is soft.      Tenderness: There is no abdominal tenderness. There is no guarding or rebound.   Musculoskeletal:      Right lower leg: No edema.      Left lower leg: No edema.   Skin:     General: Skin is warm and dry.      Capillary Refill: Capillary refill takes less than 2 seconds.   Neurological:      Mental Status: She is alert and oriented to person, place, and time.   Psychiatric:         Behavior: Behavior is cooperative.      ???????????????????????????????????????????????????????????????  ED Course/Treatment/Medical Decision Making    Social Determinants Limiting Care:  None identified         ED  Course:  Diagnoses as of 03/07/25 2307   Pain, dental   Flu-like symptoms       MDM:  ***    Impression:  Pain dental   Flu like symptoms    Disposition:  Discharge    Sonny Webber, DO   Emergency Medicine, PGY-2      Procedures ? [employer] last updated 3/7/2025 4:15 PM        afebrile, nontoxic-appearing, and appears in no acute distress. The patient is in no respiratory distress, satting well on room air. Examination was remarkable for a soft, nontender abdomen. Patient has chipped tooth #17 but no evidence of intraoral infection to warrant antibiotics at this time. No abscess. No trismus or stridor. Uvula is midline. No facial swelling. We will obtain viral testing and laboratory studies today to evaluate liver and kidney function. Patient denies needing anything for pain at this time. Chest xray was considered but the patient has no productive cough. Her lungs are clear to auscultation bilaterally. Viral testing is negative in the emergency department.  Acetaminophen level was less than 10. CBC showed no leukocytosis. Hemoglobin is 11.4. CMP showed low glucose of 65 for which patient was provided oral intake in the ED which she tolerated well. Urinalysis showed blood consistent with patient being on her menstrual period currently. Urinalysis showed leuk esterase but no bacteruria to suggest obvious UTI at this time. Patient denies any pain with urination. The patient was educated on proper use of over-the-counter Tylenol and ibuprofen.  She was instructed that this can have long-term impact on her liver and kidney if they are not taken properly. Patient expressed understanding. The patient was informed of the results. The patient remained stable and felt comfortable being discharged home. The patient was instructed of supportive measures and to follow-up with a dentist and primary care physician. Return precautions were provided, for which the patient expressed understanding. The patient was discharged home in stable condition. They should feel free to return to the Emergency Department at any time should their condition worsen or should they have any questions or concerns.     Impression:  Pain dental   Flu like symptoms    Disposition:  Discharge    Sonny Webber,    Emergency  Medicine, PGY-2      Procedures ? [employer] last updated 3/7/2025 4:15 PM          Sonny Webber DO  Resident  03/11/25 1219    I saw and evaluated the patient. I personally obtained the key and critical portions of the history and physical exam or was physically present for key and critical portions performed by the resident/fellow. I reviewed the resident/fellow's documentation and discussed the patient with the resident/fellow. I agree with the resident/fellow's medical decision making as documented in the note.    MD Yudelka Tam MD  03/12/25 [id number]

## 2025-03-08 LAB — HOLD SPECIMEN: NORMAL

## 2025-03-08 NOTE — DISCHARGE INSTRUCTIONS
You are seen today in the emergency department for flulike symptoms and dental pain.  Your laboratory studies today were within normal limits.  Please do not take more than 800 mg of ibuprofen every 8 hours.  Do not take more than 1000 mg of Tylenol every 6 hours.  Please follow-up with your primary care physician as soon as possible.  Return to the emergency department immediately if your symptoms worsen.

## 2025-03-09 ENCOUNTER — APPOINTMENT (OUTPATIENT)
Dept: RADIOLOGY | Facility: HOSPITAL | Age: 25
End: 2025-03-09
Payer: MEDICAID

## 2025-03-09 ENCOUNTER — HOSPITAL ENCOUNTER (EMERGENCY)
Facility: HOSPITAL | Age: 25
Discharge: HOME | End: 2025-03-09
Attending: EMERGENCY MEDICINE
Payer: MEDICAID

## 2025-03-09 ENCOUNTER — CLINICAL SUPPORT (OUTPATIENT)
Dept: EMERGENCY MEDICINE | Facility: HOSPITAL | Age: 25
End: 2025-03-09
Payer: MEDICAID

## 2025-03-09 VITALS
BODY MASS INDEX: 40.97 KG/M2 | WEIGHT: 240 LBS | SYSTOLIC BLOOD PRESSURE: 124 MMHG | OXYGEN SATURATION: 99 % | HEIGHT: 64 IN | TEMPERATURE: 98.1 F | RESPIRATION RATE: 16 BRPM | HEART RATE: 73 BPM | DIASTOLIC BLOOD PRESSURE: 88 MMHG

## 2025-03-09 DIAGNOSIS — N39.0 BACTERIAL URINARY TRACT INFECTION: ICD-10-CM

## 2025-03-09 DIAGNOSIS — A49.1 INFECTION DUE TO STREPTOCOCCUS PYOGENES: Primary | ICD-10-CM

## 2025-03-09 DIAGNOSIS — A49.9 BACTERIAL URINARY TRACT INFECTION: ICD-10-CM

## 2025-03-09 LAB
ALBUMIN SERPL BCP-MCNC: 4.1 G/DL (ref 3.4–5)
ALP SERPL-CCNC: 58 U/L (ref 33–110)
ALT SERPL W P-5'-P-CCNC: 9 U/L (ref 7–45)
ANION GAP SERPL CALC-SCNC: 11 MMOL/L (ref 10–20)
APPEARANCE UR: CLEAR
AST SERPL W P-5'-P-CCNC: 23 U/L (ref 9–39)
B-HCG SERPL-ACNC: <3 MIU/ML
BACTERIA UR CULT: ABNORMAL
BASOPHILS # BLD AUTO: 0.05 X10*3/UL (ref 0–0.1)
BASOPHILS NFR BLD AUTO: 0.8 %
BILIRUB SERPL-MCNC: 0.4 MG/DL (ref 0–1.2)
BILIRUB UR STRIP.AUTO-MCNC: NEGATIVE MG/DL
BUN SERPL-MCNC: 15 MG/DL (ref 6–23)
CALCIUM SERPL-MCNC: 8.9 MG/DL (ref 8.6–10.6)
CARDIAC TROPONIN I PNL SERPL HS: <3 NG/L (ref 0–34)
CHLORIDE SERPL-SCNC: 109 MMOL/L (ref 98–107)
CLUE CELLS SPEC QL WET PREP: PRESENT
CO2 SERPL-SCNC: 23 MMOL/L (ref 21–32)
COLOR UR: ABNORMAL
CREAT SERPL-MCNC: 0.79 MG/DL (ref 0.5–1.05)
EGFRCR SERPLBLD CKD-EPI 2021: >90 ML/MIN/1.73M*2
EOSINOPHIL # BLD AUTO: 0.13 X10*3/UL (ref 0–0.7)
EOSINOPHIL NFR BLD AUTO: 2 %
ERYTHROCYTE [DISTWIDTH] IN BLOOD BY AUTOMATED COUNT: 12.6 % (ref 11.5–14.5)
GLUCOSE SERPL-MCNC: 102 MG/DL (ref 74–99)
GLUCOSE UR STRIP.AUTO-MCNC: NORMAL MG/DL
HCT VFR BLD AUTO: 31.6 % (ref 36–46)
HGB BLD-MCNC: 10.7 G/DL (ref 12–16)
HIV 1+2 AB+HIV1 P24 AG SERPL QL IA: NONREACTIVE
IMM GRANULOCYTES # BLD AUTO: 0.03 X10*3/UL (ref 0–0.7)
IMM GRANULOCYTES NFR BLD AUTO: 0.5 % (ref 0–0.9)
KETONES UR STRIP.AUTO-MCNC: NEGATIVE MG/DL
LEUKOCYTE ESTERASE UR QL STRIP.AUTO: ABNORMAL
LIPASE SERPL-CCNC: 13 U/L (ref 9–82)
LYMPHOCYTES # BLD AUTO: 2.3 X10*3/UL (ref 1.2–4.8)
LYMPHOCYTES NFR BLD AUTO: 35.9 %
MAGNESIUM SERPL-MCNC: 1.94 MG/DL (ref 1.6–2.4)
MCH RBC QN AUTO: 29.9 PG (ref 26–34)
MCHC RBC AUTO-ENTMCNC: 33.9 G/DL (ref 32–36)
MCV RBC AUTO: 88 FL (ref 80–100)
MONOCYTES # BLD AUTO: 0.5 X10*3/UL (ref 0.1–1)
MONOCYTES NFR BLD AUTO: 7.8 %
NEUTROPHILS # BLD AUTO: 3.39 X10*3/UL (ref 1.2–7.7)
NEUTROPHILS NFR BLD AUTO: 53 %
NITRITE UR QL STRIP.AUTO: NEGATIVE
NRBC BLD-RTO: 0 /100 WBCS (ref 0–0)
PH UR STRIP.AUTO: 7 [PH]
PLATELET # BLD AUTO: 311 X10*3/UL (ref 150–450)
POTASSIUM SERPL-SCNC: 5.2 MMOL/L (ref 3.5–5.3)
PROT SERPL-MCNC: 7.4 G/DL (ref 6.4–8.2)
PROT UR STRIP.AUTO-MCNC: NEGATIVE MG/DL
RBC # BLD AUTO: 3.58 X10*6/UL (ref 4–5.2)
RBC # UR STRIP.AUTO: ABNORMAL MG/DL
RBC #/AREA URNS AUTO: >20 /HPF
RBC MORPH BLD: NORMAL
SODIUM SERPL-SCNC: 138 MMOL/L (ref 136–145)
SP GR UR STRIP.AUTO: 1.01
T VAGINALIS SPEC QL WET PREP: ABNORMAL
UROBILINOGEN UR STRIP.AUTO-MCNC: NORMAL MG/DL
WBC # BLD AUTO: 6.4 X10*3/UL (ref 4.4–11.3)
WBC #/AREA URNS AUTO: ABNORMAL /HPF
WBC VAG QL WET PREP: ABNORMAL
YEAST VAG QL WET PREP: ABNORMAL

## 2025-03-09 PROCEDURE — 93005 ELECTROCARDIOGRAM TRACING: CPT

## 2025-03-09 PROCEDURE — 87491 CHLMYD TRACH DNA AMP PROBE: CPT

## 2025-03-09 PROCEDURE — 81001 URINALYSIS AUTO W/SCOPE: CPT

## 2025-03-09 PROCEDURE — 83735 ASSAY OF MAGNESIUM: CPT

## 2025-03-09 PROCEDURE — 86780 TREPONEMA PALLIDUM: CPT

## 2025-03-09 PROCEDURE — 36415 COLL VENOUS BLD VENIPUNCTURE: CPT

## 2025-03-09 PROCEDURE — 71046 X-RAY EXAM CHEST 2 VIEWS: CPT

## 2025-03-09 PROCEDURE — 84702 CHORIONIC GONADOTROPIN TEST: CPT | Performed by: EMERGENCY MEDICINE

## 2025-03-09 PROCEDURE — 80053 COMPREHEN METABOLIC PANEL: CPT

## 2025-03-09 PROCEDURE — 87389 HIV-1 AG W/HIV-1&-2 AB AG IA: CPT

## 2025-03-09 PROCEDURE — 83690 ASSAY OF LIPASE: CPT

## 2025-03-09 PROCEDURE — 87077 CULTURE AEROBIC IDENTIFY: CPT

## 2025-03-09 PROCEDURE — 84484 ASSAY OF TROPONIN QUANT: CPT

## 2025-03-09 PROCEDURE — 85025 COMPLETE CBC W/AUTO DIFF WBC: CPT

## 2025-03-09 PROCEDURE — 87210 SMEAR WET MOUNT SALINE/INK: CPT

## 2025-03-09 PROCEDURE — 99285 EMERGENCY DEPT VISIT HI MDM: CPT | Mod: 25 | Performed by: EMERGENCY MEDICINE

## 2025-03-09 PROCEDURE — 2500000001 HC RX 250 WO HCPCS SELF ADMINISTERED DRUGS (ALT 637 FOR MEDICARE OP): Mod: SE

## 2025-03-09 PROCEDURE — 71046 X-RAY EXAM CHEST 2 VIEWS: CPT | Mod: FOREIGN READ | Performed by: RADIOLOGY

## 2025-03-09 PROCEDURE — 76830 TRANSVAGINAL US NON-OB: CPT

## 2025-03-09 RX ORDER — CEFUROXIME AXETIL 500 MG/1
500 TABLET ORAL 2 TIMES DAILY
Qty: 20 TABLET | Refills: 0 | Status: SHIPPED | OUTPATIENT
Start: 2025-03-09 | End: 2025-03-19

## 2025-03-09 RX ORDER — CEFUROXIME AXETIL 250 MG/1
500 TABLET ORAL ONCE
Status: COMPLETED | OUTPATIENT
Start: 2025-03-09 | End: 2025-03-09

## 2025-03-09 RX ORDER — CEFTRIAXONE 2 G/50ML
2 INJECTION, SOLUTION INTRAVENOUS ONCE
Status: DISCONTINUED | OUTPATIENT
Start: 2025-03-09 | End: 2025-03-09 | Stop reason: HOSPADM

## 2025-03-09 RX ADMIN — CEFUROXIME AXETIL 500 MG: 250 TABLET, FILM COATED ORAL at 19:37

## 2025-03-09 ASSESSMENT — HEART SCORE
AGE: <45
HISTORY: SLIGHTLY SUSPICIOUS
HEART SCORE: 1
TROPONIN: LESS THAN OR EQUAL TO NORMAL LIMIT
ECG: NORMAL
RISK FACTORS: 1-2 RISK FACTORS

## 2025-03-09 ASSESSMENT — LIFESTYLE VARIABLES
EVER FELT BAD OR GUILTY ABOUT YOUR DRINKING: NO
TOTAL SCORE: 0
HAVE YOU EVER FELT YOU SHOULD CUT DOWN ON YOUR DRINKING: NO
HAVE PEOPLE ANNOYED YOU BY CRITICIZING YOUR DRINKING: NO
EVER HAD A DRINK FIRST THING IN THE MORNING TO STEADY YOUR NERVES TO GET RID OF A HANGOVER: NO

## 2025-03-09 ASSESSMENT — PAIN SCALES - GENERAL: PAINLEVEL_OUTOF10: 8

## 2025-03-09 ASSESSMENT — PAIN DESCRIPTION - LOCATION: LOCATION: BACK

## 2025-03-09 ASSESSMENT — PAIN - FUNCTIONAL ASSESSMENT: PAIN_FUNCTIONAL_ASSESSMENT: 0-10

## 2025-03-09 NOTE — ED PROVIDER NOTES
I was notified by the charge nurse of patient's urine culture obtained 3/7/2025 grew group A strep greater than 100,000 CFU's.  Review of the note reveals patient was 8-week postpartum.  This puts her at greater risk for group A strep acquisition and endometritis.  I did attempt to get in touch with the patient at phone number listed in chart without success.  Informed charge nurse to continue calling this patient to evaluate for any lower abdominal pain/fever/systemic symptoms and urged her to return to the emergency room if this is the case for evaluation.     Brennan Alba DO  03/09/25 1258

## 2025-03-09 NOTE — ED TRIAGE NOTES
Reports getting call back for Streptococcus pyogenes (Group A Streptococcus) in urine. States 8 wks post partum

## 2025-03-09 NOTE — ED PROVIDER NOTES
Emergency Department Provider Note        History of Present Illness     History provided by: Patient  Limitations to History: None  External Records Reviewed with Brief Summary:  ED provider note    HPI:  Aliyah Green is a 24 y.o. female with past medical history of antiphospholipid syndrome, seizures presents emergency department for positive urine culture. Urine culture grew group A strep. Patient reports that she has had decreased urine output but denies any dysuria, hematuria, vaginal discharge. Patient reports she is currently on her period. She reports the pain feels like cramping and is constant. She does have family history of ACS but denies other risk factors and does not smoke. She denies any history of PE, DVT, OCP use she does endorse lower abdominal pain and fever and is postpartum. She does have allergies to amoxicillin and penicillin allergy. Patient denies productive, shortness of breath, diarrhea, constipation.    Physical Exam   Triage vitals:  T 36.7 °C (98.1 °F)  HR 61  /78  RR 16  O2 99 % None (Room air)    General: Awake, alert, in no acute distress  Eyes: Gaze conjugate.  No scleral icterus or injection  HENT: Normo-cephalic, atraumatic. No stridor  CV: Regular rate, regular rhythm. Radial pulses 2+ bilaterally  Resp: Breathing non-labored, speaking in full sentences.  Clear to auscultation bilaterally  GI: Soft, non-distended, non-tender. No rebound or guarding. RLQ tenderness. Right CVA tenderness  MSK/Extremities: No gross bony deformities. Moving all extremities  : exam os is open with active bleeding from menstruation. No notable CMT tenderness  Skin: Warm. Appropriate color  Neuro: Alert. Oriented. Face symmetric. Speech is fluent.  Gross strength and sensation intact in b/l UE and LEs  Psych: Appropriate mood and affect    Medical Decision Making & ED Course   Medical Decision Makin y.o. female here for chest pain, abdominal pain. Urine culture was positive. Patient  is 8 weeks postpartum with abdominal pain and positive urine culture concerning for possible endometritis, pyelonephritis. Patient also does have chest pain, with infectious symptoms, workup to assess for any myocarditis, viral URI. Heart score low risk. Lungs clear to auscultation bilaterally and chest x-ray ordered to rule out any signs of pneumonia, pneumothorax, widened mediastinum, or mass.  PERC negative, do not suspect a PE as a cause of patient's pain. Pelvic US ordered to assess for retained products, TOA. Pelvic exam performed and STD test sent. She reports she had STD testing done at urgent care and was negative around the 20th at urgent care. On pelvic exam os is open with active bleeding from menstruation. No notable CMT tenderness. Repeat UA sent and concerning for UTI. Patient started on antibiotics. Patient has no leukocytosis. Pelvic US unremarkable. As a result of the work-up, patient was discharged home.  They were informed of their diagnosis and instructed to come back with any concerns or worsening of condition and was agreeable to the plan as discussed above.  The patient was given the opportunity to ask questions.  All of the patient's questions were answered.  The patient remained stable under my care.     ----  Differential diagnoses considered include but are not limited to: ACS, PE, aortic dissection, pleuritis, pneumonia, cardiac tamponade, pneumothorax, pericarditis, GERD, esophageal rupture, costochondritis, musculoskeletal pain, STD, PID, TOA, pyelonephritis     Social Determinants of Health which Significantly Impact Care: None identified     EKG Independent Interpretation: EKG interpreted by myself. Please see ED Course for full interpretation.    Independent Result Review and Interpretation: Relevant laboratory and radiographic results were reviewed and independently interpreted by myself.  As necessary, they are commented on in the ED Course.    Chronic conditions affecting the  patient's care: As documented above in [facility]    The patient was discussed with the following consultants/services: None    Care Considerations: As documented above in [facility]    ED Course:  ED Course as of 03/09/25 2043   Sun Mar 09, 2025   1643 I independently interpreted the EKG:  Regular rate. Regular rhythm. Normal axis.   Normal FL, QRS, and Qtc intervals.   No ST segment elevations, depressions, or T wave inversions. [AT]   1831 CXR unremarkable [AT]   1831 Leukocyte Esterase, Urine(!): 25 Arlyn/uL [AT]   1832 WBC, Urine(!): 6-10  Signs of uti present [AT]      ED Course User Index  [AT] Yola Thomas MD         Diagnoses as of 03/09/25 2043   Infection due to Streptococcus pyogenes   Bacterial urinary tract infection     Disposition   As a result of the work-up, the patient was discharged home.  she was informed of her diagnosis and instructed to come back with any concerns or worsening of condition.  she and was agreeable to the plan as discussed above.  she was given the opportunity to ask questions.  All of the patient's questions were answered.    Procedures   Procedures    Patient seen and discussed with ED attending physician.    Yola Thomas MD  Emergency Medicine            Yola Thomas MD  Resident  03/09/25 2043

## 2025-03-10 ENCOUNTER — TELEPHONE (OUTPATIENT)
Dept: PHARMACY | Facility: HOSPITAL | Age: 25
End: 2025-03-10
Payer: MEDICAID

## 2025-03-10 DIAGNOSIS — B96.89 BV (BACTERIAL VAGINOSIS): Primary | ICD-10-CM

## 2025-03-10 DIAGNOSIS — N76.0 BV (BACTERIAL VAGINOSIS): Primary | ICD-10-CM

## 2025-03-10 LAB
ATRIAL RATE: 67 BPM
BACTERIA UR CULT: ABNORMAL
C TRACH RRNA SPEC QL NAA+PROBE: NEGATIVE
HOLD SPECIMEN: NORMAL
N GONORRHOEA DNA SPEC QL PROBE+SIG AMP: NEGATIVE
P AXIS: 61 DEGREES
P OFFSET: 181 MS
P ONSET: 133 MS
PR INTERVAL: 180 MS
Q ONSET: 223 MS
QRS COUNT: 11 BEATS
QRS DURATION: 70 MS
QT INTERVAL: 374 MS
QTC CALCULATION(BAZETT): 395 MS
QTC FREDERICIA: 388 MS
R AXIS: 69 DEGREES
T AXIS: 49 DEGREES
T OFFSET: 410 MS
TREPONEMA PALLIDUM IGG+IGM AB [PRESENCE] IN SERUM OR PLASMA BY IMMUNOASSAY: NONREACTIVE
VENTRICULAR RATE: 67 BPM

## 2025-03-10 RX ORDER — METRONIDAZOLE 500 MG/1
500 TABLET ORAL 2 TIMES DAILY
Qty: 14 TABLET | Refills: 0 | Status: SHIPPED | OUTPATIENT
Start: 2025-03-10 | End: 2025-03-17

## 2025-03-10 NOTE — DISCHARGE INSTRUCTIONS
You were seen in the emergency department after urine culture grew Strep Pyogenes, a type of bacteria. Your ultrasound did not show any signs of a uterine or pelvic infection. We feel that it is safe for you to go home but it is very important that you closely monitor symptoms and return with any worsening or new symptoms, including but not limited to fevers, vomiting, difficulty urinating, or other concerning symptoms. Follow up with your primary doctor as well as OB (for your ongoing bleeding). A prescription for antibiotics has been sent to your pharmacy. It is important that you complete the entire course. Take tylenol as needed for pain.

## 2025-03-10 NOTE — PROGRESS NOTES
EDPD Note: Duration Adjust    Contacted Aliyah Green regarding positive urine culture/result that was taken during their recent emergency room visit. I completed education with patient. The patient is being treated with the proper medication; however, the duration of the discharge prescription will be adjusted due to symptom improvement. I gave verbal education about the new medication duration found below. No further follow up needed from EDPD Team.     Pt was sent back to ED after Group A Strep was seen in Urine culture. Pt was discarged with Cefuroxime 500mg BID x 10 days. ED providers and pt were aware of culture. Today pt reported not starting abx therapy, will start today. Advised to decrease total duration to 7 days.     Discharged with: Cefuroxime 500mg BID x 10 days.   New recommendation: Cefuroxime 500mg BID x 5 days.     Susceptibility data from last 90 days.  Collected Specimen Info Organism   03/07/25 Urine from Clean Catch/Voided Streptococcus pyogenes (Group A Streptococcus)       If there are any other questions for the ED Post-Discharge Culture Follow Up Team, please contact [phone]. Fax: [phone].    Bertha Hansen RPh

## 2025-03-10 NOTE — PROGRESS NOTES
EDPD Note: Initiation     Contacted Aliyah Green regarding a positive clue cells culture/result that was taken during their recent emergency room visit. I completed education with patient. The patient is not being treated appropriately.    The following prescription was sent to the patient's preferred pharmacy. No further follow up needed from EDPD Team.     Drug: Metronidazole 500mg BID x 7 days    Pt was sent back to ED after Group A Strep was seen in Urine culture. Pt tested positive for BV, will send rx for metronidazole. Advised pt to keep OB appt for this Friday.     Susceptibility data from last 90 days.  Collected Specimen Info Organism   03/07/25 Urine from Clean Catch/Voided Streptococcus pyogenes (Group A Streptococcus)       If there are any other questions for the ED Post-Discharge Culture Follow Up Team, please contact [phone]. Fax: [phone].    Bertha Hansen RPh